# Patient Record
Sex: MALE | Race: BLACK OR AFRICAN AMERICAN | NOT HISPANIC OR LATINO | URBAN - METROPOLITAN AREA
[De-identification: names, ages, dates, MRNs, and addresses within clinical notes are randomized per-mention and may not be internally consistent; named-entity substitution may affect disease eponyms.]

---

## 2024-08-05 PROCEDURE — 90792 PSYCH DIAG EVAL W/MED SRVCS: CPT | Mod: 95

## 2024-08-13 ENCOUNTER — INPATIENT (INPATIENT)
Facility: HOSPITAL | Age: 49
LOS: 28 days | Discharge: HOME | End: 2024-09-11
Attending: PSYCHIATRY & NEUROLOGY | Admitting: PSYCHIATRY & NEUROLOGY
Payer: MEDICAID

## 2024-08-13 VITALS
TEMPERATURE: 98 F | RESPIRATION RATE: 16 BRPM | OXYGEN SATURATION: 97 % | DIASTOLIC BLOOD PRESSURE: 83 MMHG | HEART RATE: 105 BPM | SYSTOLIC BLOOD PRESSURE: 122 MMHG

## 2024-08-13 PROCEDURE — 99285 EMERGENCY DEPT VISIT HI MDM: CPT

## 2024-08-13 NOTE — ED BEHAVIORAL HEALTH ASSESSMENT NOTE - NSBHATTESTATTENDBILLTIME_PSY_A_CORE
I attest my time as attending is greater than CHUCKIE time spent on qualifying patient care activities.
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
## 2024-08-13 NOTE — ED PROVIDER NOTE - CLINICAL SUMMARY MEDICAL DECISION MAKING FREE TEXT BOX
This is a 49 male presents old male past medical history depression,  Complaining of anxiety depression and suicidal ideation. Endorsed recently was discharged from [facility] for suicidality and depression. He opted out to take medication and  not feeling better. Reports life stressors as losing his apartment due to financial issues,  loosing his unemployment. Reports  intrusive thoughts and intermittent ah. Endorses pervious si attempt via od on pills many years ago. Reports drunk 1 beer today pta.
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
## 2024-08-13 NOTE — ED BEHAVIORAL HEALTH ASSESSMENT NOTE - OTHER PAST PSYCHIATRIC HISTORY (INCLUDE DETAILS REGARDING ONSET, COURSE OF ILLNESS, INPATIENT/OUTPATIENT TREATMENT)
[facility] - 8/6-8/13/24  [facility] [address]- s/p suicide attempt by overdose.

## 2024-08-13 NOTE — ED BEHAVIORAL HEALTH ASSESSMENT NOTE - NSBHATTESTTYPEVISIT_PSY_A_CORE
Attending evaluating patient with CHUCKIE (24603/59106 code) On-site Attending supervising CHUCKIE (99XXX codes)

## 2024-08-13 NOTE — ED ADULT NURSE NOTE - OBJECTIVE STATEMENT
Patient brought in by EMS for psych eval, family called 911 because pt has been making depressed and making suicidal statements denies plan or intent d/t housing and financial issues. Pt is awake, a&ox4, Calm and cooperative with sad affect. Pt reports consuming 1 beer today; denies illicit drug use. Reports recently discharged from [facility] for similar presentation. Pt denies AVH, paranoia or delusional thinking.
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
## 2024-08-13 NOTE — ED PROVIDER NOTE - OBJECTIVE STATEMENT
This is a 49 male presents old male past medical history depression,  Complaining of anxiety depression and suicidal ideation. Endorsed recently was discharged from [facility] for suicidality and depression. He opted out to take medication and  not feeling better. Reports life stressors as losing his apartment due to financial issues,  loosing his unemployment. Reports  intrusive thoughts and intermittent ah. Endorses pervious si attempt via od on pills many years ago. Reports drunk 1 beer today pta.

## 2024-08-13 NOTE — ED ADULT NURSE NOTE - TEMPLATE LIST FOR HEAD TO TOE ASSESSMENT
Psych/Behavioral
Follow up with your pediatrician within 48 hours of discharge.     Please follow-up with pediatric hematology for maternal history of factor V leiden trait

## 2024-08-13 NOTE — ED BEHAVIORAL HEALTH ASSESSMENT NOTE - SUBSTANCE ISSUES AND PLAN (INCLUDE STANDING AND PRN MEDICATION)
ETOH abuse- no concerns for withdrawal at this time - discharge today from [facility]
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
## 2024-08-13 NOTE — ED BEHAVIORAL HEALTH ASSESSMENT NOTE - ADDITIONAL DETAILS ALL
suicide attempt over 10 years by overdose. suicide attempt over 10 years by overdose.- per report required intubation
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
## 2024-08-13 NOTE — ED ADULT TRIAGE NOTE - CHIEF COMPLAINT QUOTE
Patient brought in by EMS for psych eval. Per EMS, family called 911 because pt. has been making depressed and making suicidal statements. Calm and cooperative in triage. No PMH.

## 2024-08-13 NOTE — ED BEHAVIORAL HEALTH ASSESSMENT NOTE - DESCRIPTION
cam and cooperative reported right knee arthritic pain. Lives alone- facing imminent eviction from home. Reports little support. calm and cooperative

## 2024-08-14 DIAGNOSIS — F33.2 MAJOR DEPRESSIVE DISORDER, RECURRENT SEVERE WITHOUT PSYCHOTIC FEATURES: ICD-10-CM

## 2024-08-14 DIAGNOSIS — F10.10 ALCOHOL ABUSE, UNCOMPLICATED: ICD-10-CM

## 2024-08-14 PROBLEM — Z91.51 PERSONAL HISTORY OF SUICIDAL BEHAVIOR: Chronic | Status: ACTIVE | Noted: 2024-08-04

## 2024-08-14 LAB
ALBUMIN SERPL ELPH-MCNC: 4.7 G/DL — SIGNIFICANT CHANGE UP (ref 3.3–5)
ALP SERPL-CCNC: 87 U/L — SIGNIFICANT CHANGE UP (ref 40–120)
ALT FLD-CCNC: 20 U/L — SIGNIFICANT CHANGE UP (ref 4–41)
AMPHET UR-MCNC: NEGATIVE — SIGNIFICANT CHANGE UP
ANION GAP SERPL CALC-SCNC: 14 MMOL/L — SIGNIFICANT CHANGE UP (ref 7–14)
APAP SERPL-MCNC: <10 UG/ML — LOW (ref 15–25)
APPEARANCE UR: CLEAR — SIGNIFICANT CHANGE UP
AST SERPL-CCNC: 26 U/L — SIGNIFICANT CHANGE UP (ref 4–40)
BACTERIA # UR AUTO: NEGATIVE /HPF — SIGNIFICANT CHANGE UP
BARBITURATES UR SCN-MCNC: NEGATIVE — SIGNIFICANT CHANGE UP
BASOPHILS # BLD AUTO: 0.06 K/UL — SIGNIFICANT CHANGE UP (ref 0–0.2)
BASOPHILS NFR BLD AUTO: 0.9 % — SIGNIFICANT CHANGE UP (ref 0–2)
BENZODIAZ UR-MCNC: NEGATIVE — SIGNIFICANT CHANGE UP
BILIRUB SERPL-MCNC: <0.2 MG/DL — SIGNIFICANT CHANGE UP (ref 0.2–1.2)
BILIRUB UR-MCNC: NEGATIVE — SIGNIFICANT CHANGE UP
BUN SERPL-MCNC: 16 MG/DL — SIGNIFICANT CHANGE UP (ref 7–23)
CALCIUM SERPL-MCNC: 9.8 MG/DL — SIGNIFICANT CHANGE UP (ref 8.4–10.5)
CAST: 0 /LPF — SIGNIFICANT CHANGE UP (ref 0–4)
CHLORIDE SERPL-SCNC: 101 MMOL/L — SIGNIFICANT CHANGE UP (ref 98–107)
CO2 SERPL-SCNC: 21 MMOL/L — LOW (ref 22–31)
COCAINE METAB.OTHER UR-MCNC: NEGATIVE — SIGNIFICANT CHANGE UP
COLOR SPEC: YELLOW — SIGNIFICANT CHANGE UP
CREAT SERPL-MCNC: 1.04 MG/DL — SIGNIFICANT CHANGE UP (ref 0.5–1.3)
CREATININE URINE RESULT, DAU: 192 MG/DL — SIGNIFICANT CHANGE UP
DIFF PNL FLD: ABNORMAL
EGFR: 89 ML/MIN/1.73M2 — SIGNIFICANT CHANGE UP
EOSINOPHIL # BLD AUTO: 0.15 K/UL — SIGNIFICANT CHANGE UP (ref 0–0.5)
EOSINOPHIL NFR BLD AUTO: 2.3 % — SIGNIFICANT CHANGE UP (ref 0–6)
ETHANOL SERPL-MCNC: 98 MG/DL — HIGH
FENTANYL UR QL SCN: NEGATIVE — SIGNIFICANT CHANGE UP
GLUCOSE SERPL-MCNC: 96 MG/DL — SIGNIFICANT CHANGE UP (ref 70–99)
GLUCOSE UR QL: NEGATIVE MG/DL — SIGNIFICANT CHANGE UP
HCT VFR BLD CALC: 41.4 % — SIGNIFICANT CHANGE UP (ref 39–50)
HGB BLD-MCNC: 13.9 G/DL — SIGNIFICANT CHANGE UP (ref 13–17)
IANC: 2.82 K/UL — SIGNIFICANT CHANGE UP (ref 1.8–7.4)
IMM GRANULOCYTES NFR BLD AUTO: 0.3 % — SIGNIFICANT CHANGE UP (ref 0–0.9)
KETONES UR-MCNC: NEGATIVE MG/DL — SIGNIFICANT CHANGE UP
LEUKOCYTE ESTERASE UR-ACNC: NEGATIVE — SIGNIFICANT CHANGE UP
LYMPHOCYTES # BLD AUTO: 2.58 K/UL — SIGNIFICANT CHANGE UP (ref 1–3.3)
LYMPHOCYTES # BLD AUTO: 39 % — SIGNIFICANT CHANGE UP (ref 13–44)
MCHC RBC-ENTMCNC: 31.9 PG — SIGNIFICANT CHANGE UP (ref 27–34)
MCHC RBC-ENTMCNC: 33.6 GM/DL — SIGNIFICANT CHANGE UP (ref 32–36)
MCV RBC AUTO: 95 FL — SIGNIFICANT CHANGE UP (ref 80–100)
METHADONE UR-MCNC: NEGATIVE — SIGNIFICANT CHANGE UP
MONOCYTES # BLD AUTO: 0.98 K/UL — HIGH (ref 0–0.9)
MONOCYTES NFR BLD AUTO: 14.8 % — HIGH (ref 2–14)
NEUTROPHILS # BLD AUTO: 2.82 K/UL — SIGNIFICANT CHANGE UP (ref 1.8–7.4)
NEUTROPHILS NFR BLD AUTO: 42.7 % — LOW (ref 43–77)
NITRITE UR-MCNC: NEGATIVE — SIGNIFICANT CHANGE UP
NRBC # BLD: 0 /100 WBCS — SIGNIFICANT CHANGE UP (ref 0–0)
NRBC # FLD: 0 K/UL — SIGNIFICANT CHANGE UP (ref 0–0)
OPIATES UR-MCNC: NEGATIVE — SIGNIFICANT CHANGE UP
OXYCODONE UR-MCNC: NEGATIVE — SIGNIFICANT CHANGE UP
PCP SPEC-MCNC: SIGNIFICANT CHANGE UP
PCP UR-MCNC: POSITIVE
PH UR: 5 — SIGNIFICANT CHANGE UP (ref 5–8)
PLATELET # BLD AUTO: 321 K/UL — SIGNIFICANT CHANGE UP (ref 150–400)
POTASSIUM SERPL-MCNC: 4.2 MMOL/L — SIGNIFICANT CHANGE UP (ref 3.5–5.3)
POTASSIUM SERPL-SCNC: 4.2 MMOL/L — SIGNIFICANT CHANGE UP (ref 3.5–5.3)
PROT SERPL-MCNC: 8.1 G/DL — SIGNIFICANT CHANGE UP (ref 6–8.3)
PROT UR-MCNC: SIGNIFICANT CHANGE UP MG/DL
RBC # BLD: 4.36 M/UL — SIGNIFICANT CHANGE UP (ref 4.2–5.8)
RBC # FLD: 13.9 % — SIGNIFICANT CHANGE UP (ref 10.3–14.5)
RBC CASTS # UR COMP ASSIST: 1 /HPF — SIGNIFICANT CHANGE UP (ref 0–4)
SALICYLATES SERPL-MCNC: <0.3 MG/DL — LOW (ref 15–30)
SARS-COV-2 RNA SPEC QL NAA+PROBE: SIGNIFICANT CHANGE UP
SODIUM SERPL-SCNC: 136 MMOL/L — SIGNIFICANT CHANGE UP (ref 135–145)
SP GR SPEC: 1.03 — HIGH (ref 1–1.03)
SQUAMOUS # UR AUTO: 0 /HPF — SIGNIFICANT CHANGE UP (ref 0–5)
THC UR QL: NEGATIVE — SIGNIFICANT CHANGE UP
TOXICOLOGY SCREEN, DRUGS OF ABUSE, SERUM RESULT: SIGNIFICANT CHANGE UP
TSH SERPL-MCNC: 1.63 UIU/ML — SIGNIFICANT CHANGE UP (ref 0.27–4.2)
UROBILINOGEN FLD QL: 0.2 MG/DL — SIGNIFICANT CHANGE UP (ref 0.2–1)
WBC # BLD: 6.61 K/UL — SIGNIFICANT CHANGE UP (ref 3.8–10.5)
WBC # FLD AUTO: 6.61 K/UL — SIGNIFICANT CHANGE UP (ref 3.8–10.5)
WBC UR QL: 1 /HPF — SIGNIFICANT CHANGE UP (ref 0–5)

## 2024-08-14 PROCEDURE — 99212 OFFICE O/P EST SF 10 MIN: CPT

## 2024-08-14 PROCEDURE — 99223 1ST HOSP IP/OBS HIGH 75: CPT | Mod: 25

## 2024-08-14 RX ORDER — ACETAMINOPHEN WITH CODEINE 300MG-30MG
500 TABLET ORAL EVERY 12 HOURS
Refills: 0 | Status: DISCONTINUED | OUTPATIENT
Start: 2024-08-14 | End: 2024-08-25

## 2024-08-14 RX ORDER — HYDROXYZINE HCL 25 MG
50 TABLET ORAL EVERY 6 HOURS
Refills: 0 | Status: DISCONTINUED | OUTPATIENT
Start: 2024-08-14 | End: 2024-09-11

## 2024-08-14 RX ORDER — LORAZEPAM 4 MG/ML
2 INJECTION INTRAMUSCULAR; INTRAVENOUS ONCE
Refills: 0 | Status: DISCONTINUED | OUTPATIENT
Start: 2024-08-14 | End: 2024-08-19

## 2024-08-14 NOTE — ED ADULT NURSE REASSESSMENT NOTE - NS ED NURSE REASSESS COMMENT FT1
Pt remains resting comfortably in bed with even and unlabored respirations observed. Pending psychiatric voluntary admission when bed is available. Ongoing monitoring for safety continues.

## 2024-08-14 NOTE — BH INPATIENT PSYCHIATRY ASSESSMENT NOTE - NSBHCHARTREVIEWVS_PSY_A_CORE FT
Vital Signs Last 24 Hrs  T(C): 36.8 (08-14-24 @ 12:48), Max: 36.9 (08-13-24 @ 22:18)  T(F): 98.2 (08-14-24 @ 12:48), Max: 98.4 (08-13-24 @ 22:18)  HR: 66 (08-14-24 @ 12:48) (66 - 105)  BP: 116/78 (08-14-24 @ 12:48) (116/78 - 124/78)  BP(mean): --  RR: 16 (08-14-24 @ 12:48) (16 - 18)  SpO2: 99% (08-14-24 @ 12:48) (97% - 99%)
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
## 2024-08-14 NOTE — BH INPATIENT PSYCHIATRY ASSESSMENT NOTE - CURRENT MEDICATION
MEDICATIONS  (STANDING):    MEDICATIONS  (PRN):  hydrOXYzine hydrochloride 50 milliGRAM(s) Oral every 6 hours PRN acute anxiety  LORazepam   Injectable 2 milliGRAM(s) IntraMuscular Once PRN severe agitation
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
## 2024-08-14 NOTE — ED ADULT NURSE REASSESSMENT NOTE - NS ED NURSE REASSESS COMMENT FT1
Pt is currently laying in bed with even unlabored respirations observed. Psych eval completed, lab results pending for voluntary psychiatric admission. Ongoing monitoring for safety continues.

## 2024-08-14 NOTE — ED BEHAVIORAL HEALTH PROGRESS NOTE - RISK ASSESSMENT
moderate risk- past suicide attempt, current passive suicidal ideation, facing eviction from home, loss of job.     protective- seeking treatment, reports reason for living, futuristic.

## 2024-08-14 NOTE — ED BEHAVIORAL HEALTH PROGRESS NOTE - SUBSTANCE ISSUES AND PLAN (INCLUDE STANDING AND PRN MEDICATION)
ETOH abuse- no concerns for withdrawal at this time - discharge today from [facility]
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
## 2024-08-14 NOTE — BH INPATIENT PSYCHIATRY ASSESSMENT NOTE - OTHER PAST PSYCHIATRIC HISTORY (INCLUDE DETAILS REGARDING ONSET, COURSE OF ILLNESS, INPATIENT/OUTPATIENT TREATMENT)
[facility] - 8/6-8/13/24  [facility] [address]- s/p suicide attempt by overdose.  another reported suicide attempt between job perhaps around 2021, undocumented, no follow up

## 2024-08-14 NOTE — BH PATIENT PROFILE - FALL HARM RISK - UNIVERSAL INTERVENTIONS
Bed in lowest position, wheels locked, appropriate side rails in place/Call bell, personal items and telephone in reach/Instruct patient to call for assistance before getting out of bed or chair/Non-slip footwear when patient is out of bed/[place] to call system/Physically safe environment - no spills, clutter or unnecessary equipment/Purposeful Proactive Rounding/Room/bathroom lighting operational, light cord in reach

## 2024-08-15 PROCEDURE — 99232 SBSQ HOSP IP/OBS MODERATE 35: CPT

## 2024-08-15 RX ORDER — TUBERCULIN,PPD,MULTI-PUNCTURE
5 SKIN TEST INTRADERMAL ONCE
Refills: 0 | Status: COMPLETED | OUTPATIENT
Start: 2024-08-15 | End: 2024-08-15

## 2024-08-15 RX ADMIN — Medication 5 UNIT(S): at 13:01

## 2024-08-15 NOTE — BH SOCIAL WORK INITIAL PSYCHOSOCIAL EVALUATION - OTHER PAST PSYCHIATRIC HISTORY (INCLUDE DETAILS REGARDING ONSET, COURSE OF ILLNESS, INPATIENT/OUTPATIENT TREATMENT)
Pt is a 48 y/o single male, non-caregiver, domiciled alone, with a history of depression and 1 documented suicide attempt 10+ years ago by OD, resulting in a medical admission. Pt came to the ED via EMS the same day of his discharge from [facility], after going to his aunt's house and asking her to call 911. Pt had returned to his apartment, where he has no electricity, and suicidal thoughts returned. Pt has a history of alcohol abuse and PCP use. He was sober x 10 years but relapsed 1.5 years ago. 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 Currently reporting feeling helpless and hopeless about his future.

## 2024-08-15 NOTE — BH SOCIAL WORK INITIAL PSYCHOSOCIAL EVALUATION - NSBHLEGALRESTRAINPT_PSY_ALL_CORE
No Assistance OOB with selected safe patient handling equipment if applicable/Assistance with ambulation/Communicate fall risk and risk factors to all staff, patient, and family/Provide visual cue: yellow wristband, yellow gown, etc/Reinforce activity limits and safety measures with patient and family/Call bell, personal items and telephone in reach/Instruct patient to call for assistance before getting out of bed/chair/stretcher/Non-slip footwear applied when patient is off stretcher/[place] to call system/Physically safe environment - no spills, clutter or unnecessary equipment/Purposeful Proactive Rounding/Room/bathroom lighting operational, light cord in reach

## 2024-08-15 NOTE — BH SOCIAL WORK INITIAL PSYCHOSOCIAL EVALUATION - NSBHSUBSTANCELAST_PSY_ALL_CORE
How Severe Is Your Skin Lesion?: moderate Has Your Skin Lesion Been Treated?: not been treated Is This A New Presentation, Or A Follow-Up?: Skin Lesion Within the past 30 days

## 2024-08-15 NOTE — BH TREATMENT PLAN - NSTXSUBMISGOALOTHER_PSY_ALL_CORE
Patient will benefit from identifying and conveying an understanding of 2-3 triggers to substance use.

## 2024-08-15 NOTE — BH TREATMENT PLAN - NSTXCOPEINTERRN_PSY_ALL_CORE
Assess current coping skills  Assess readiness to learn  Encourage daily participation in daily psychoeducational groups and activities   Monitor medication compliance and response
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
## 2024-08-15 NOTE — BH SOCIAL WORK INITIAL PSYCHOSOCIAL EVALUATION - NSBHSAALC_PSY_A_CORE FT
reports drinking a beer today  Was sober for 10 years, then began abusing alcohol again approx 1 year ago. Was drinking up to 4 pints of vodka/day.

## 2024-08-15 NOTE — PSYCHIATRIC REHAB INITIAL EVALUATION - NSBHPRRECOMMEND_PSY_ALL_CORE
Patient was receptive to interview with writer and allowed orientation to psychiatric rehabilitation role and function on the unit.  Patient was forthcoming with admitting circumstances and presented with a generally dysthymic/subdued mood.   Patient was dressed casually yet neatly.   Patient was receptive to establish a relevant psychiatric rehabilitation to identify triggers to substance use.    At the time of interview, patient denied SI/HI.

## 2024-08-15 NOTE — BH TREATMENT PLAN - NSCMSPTSTRENGTHS_PSY_ALL_CORE
75 year old female with medical history of HTN and DM presented to ED with worsening shortness of breath and chest discomfort,NSTEM- TYpe I MI, pulmonary edema .  1.Tele monitoring  2.NSTEM-heparin drip,asa,b blocker,statin.  3.HTN-coreg, cozaar 50mg bid.  4.Echocardiogram.  5.DM-Insulin.  6.Lipid d/o-statin.  7.Cardiac cath today.  8.Pulmonary edema-d/c IV lasix.  9.PPI.
Highly motivated for treatment

## 2024-08-15 NOTE — BH TREATMENT PLAN - NSTXSUBMISINTERPR_PSY_ALL_CORE
Patient will benefit from 1:1 engagement, psycho-education and support to facilitate progress towards specified goal.  Patient is also encouraged to attend groups for communal support and engagement.

## 2024-08-16 PROCEDURE — 99231 SBSQ HOSP IP/OBS SF/LOW 25: CPT

## 2024-08-16 RX ADMIN — Medication 500 MILLIGRAM(S): at 19:35

## 2024-08-16 RX ADMIN — Medication 500 MILLIGRAM(S): at 20:35

## 2024-08-16 NOTE — BH INPATIENT PSYCHIATRY PROGRESS NOTE - NSBHASSESSSUMMFT_PSY_ALL_CORE
Patient is a 49 year old, male; domicile alone; single; noncaregiver; unemployed; PPH of depression; two previous hospitalizations (discharged today from [facility]) ; one past suicide attempt by overdose over 10 years ago; remote history of violence or arrests; h/o ETOH and PCP abuse ( reports having one beer today); no known h/o complicated withdrawal; PMH of arthritis to right knee; brought in by EMS; called by aunt for suicidal ideation.   He was discharged from another inpatient service on the day of admission.  Utox +etoh (98) and PCP.  Differential includes mdd, severe and subtance induced mood disorder.      Emotionally regulated, in good behavioral control, continue plan below.     1. Continue 913 hospitalization for stabilization and safety  2. haldol, ativan and benadryl prns away from alcohol while on other inpatient services, one day of drinking, withdrawal seizures unlikely  3. routine checks--reprots safe on unit, will notify staff if suicidality retruns  4.  does not favor antidepressent.  discussed wellbutrin but seizure risk.  discuss remeron.  will re-approach in am  5.  arthritic knee pain--naproxen prn  6. smoking cessation--decliens patch will write for gum

## 2024-08-17 PROCEDURE — 99231 SBSQ HOSP IP/OBS SF/LOW 25: CPT

## 2024-08-17 RX ORDER — POLYETHYLENE GLYCOL 3350 17 G/17G
17 POWDER, FOR SOLUTION ORAL ONCE
Refills: 0 | Status: COMPLETED | OUTPATIENT
Start: 2024-08-17 | End: 2024-08-18

## 2024-08-17 RX ADMIN — Medication 5 UNIT(S): at 17:27

## 2024-08-17 RX ADMIN — Medication 500 MILLIGRAM(S): at 09:58

## 2024-08-17 RX ADMIN — Medication 500 MILLIGRAM(S): at 08:46

## 2024-08-17 NOTE — BH INPATIENT PSYCHIATRY PROGRESS NOTE - NSBHASSESSSUMMFT_PSY_ALL_CORE
Patient is a 49 year old, male; domicile alone; single; noncaregiver; unemployed; PPH of depression; two previous hospitalizations (discharged today from [facility]) ; one past suicide attempt by overdose over 10 years ago; remote history of violence or arrests; h/o ETOH and PCP abuse ( reports having one beer today); no known h/o complicated withdrawal; PMH of arthritis to right knee; brought in by EMS; called by aunt for suicidal ideation.   He was discharged from another inpatient service on the day of admission.  Utox +etoh (98) and PCP.  Differential includes mdd, severe and subtance induced mood disorder.      Emotionally regulated, in good behavioral control, continue plan below. Refusing to stat on any meds. Denies current SIIP    1. Continue 913 hospitalization for stabilization and safety  2. haldol, ativan and benadryl prns away from alcohol while on other inpatient services, one day of drinking, withdrawal seizures unlikely  3. routine checks--reprots safe on unit, will notify staff if suicidality retruns  4.  does not favor antidepressent.  discussed wellbutrin but seizure risk.  discuss remeron.  will re-approach in am  5.  arthritic knee pain--naproxen prn  6. smoking cessation--decliens patch will write for gum

## 2024-08-18 PROCEDURE — 99231 SBSQ HOSP IP/OBS SF/LOW 25: CPT

## 2024-08-18 RX ORDER — TRAZODONE HCL 50 MG
50 TABLET ORAL AT BEDTIME
Refills: 0 | Status: DISCONTINUED | OUTPATIENT
Start: 2024-08-18 | End: 2024-09-11

## 2024-08-18 RX ADMIN — POLYETHYLENE GLYCOL 3350 17 GRAM(S): 17 POWDER, FOR SOLUTION ORAL at 08:52

## 2024-08-18 RX ADMIN — Medication 500 MILLIGRAM(S): at 08:48

## 2024-08-18 RX ADMIN — Medication 500 MILLIGRAM(S): at 22:01

## 2024-08-18 RX ADMIN — Medication 50 MILLIGRAM(S): at 21:27

## 2024-08-18 RX ADMIN — Medication 500 MILLIGRAM(S): at 21:28

## 2024-08-18 NOTE — BH INPATIENT PSYCHIATRY PROGRESS NOTE - NSBHASSESSSUMMFT_PSY_ALL_CORE
Patient is a 49 year old, male; domicile alone; single; noncaregiver; unemployed; PPH of depression; two previous hospitalizations (discharged today from [facility]) ; one past suicide attempt by overdose over 10 years ago; remote history of violence or arrests; h/o ETOH and PCP abuse ( reports having one beer today); no known h/o complicated withdrawal; PMH of arthritis to right knee; brought in by EMS; called by aunt for suicidal ideation.   He was discharged from another inpatient service on the day of admission.  Utox +etoh (98) and PCP.  Differential includes mdd, severe and subtance induced mood disorder.      Pt. reports attenuated Sx, denies AVH, delusions, SI/HI. He received Miralax for constipation. No additional concerns. Will continue current plan.     1. Continue 913 hospitalization for stabilization and safety  2. haldol, ativan and benadryl prns away from alcohol while on other inpatient services, one day of drinking, withdrawal seizures unlikely  3. routine checks--reprots safe on unit, will notify staff if suicidality retruns  4.  does not favor antidepressent.  discussed wellbutrin but seizure risk.  discuss remeron.  will re-approach in am  5.  arthritic knee pain--naproxen prn  6. smoking cessation--declines patch will write for gum

## 2024-08-19 PROCEDURE — 99232 SBSQ HOSP IP/OBS MODERATE 35: CPT

## 2024-08-19 RX ORDER — BUPROPION HYDROCHLORIDE 150 MG/1
150 TABLET ORAL DAILY
Refills: 0 | Status: DISCONTINUED | OUTPATIENT
Start: 2024-08-20 | End: 2024-08-29

## 2024-08-19 RX ORDER — TRAZODONE HCL 50 MG
100 TABLET ORAL AT BEDTIME
Refills: 0 | Status: DISCONTINUED | OUTPATIENT
Start: 2024-08-19 | End: 2024-09-11

## 2024-08-19 RX ORDER — LORAZEPAM 4 MG/ML
2 INJECTION INTRAMUSCULAR; INTRAVENOUS ONCE
Refills: 0 | Status: DISCONTINUED | OUTPATIENT
Start: 2024-08-19 | End: 2024-08-26

## 2024-08-19 RX ORDER — POLYETHYLENE GLYCOL 3350 17 G/17G
17 POWDER, FOR SOLUTION ORAL
Refills: 0 | Status: DISCONTINUED | OUTPATIENT
Start: 2024-08-19 | End: 2024-09-11

## 2024-08-19 RX ORDER — MAGNESIUM, ALUMINUM HYDROXIDE 200-225/5
30 SUSPENSION, ORAL (FINAL DOSE FORM) ORAL EVERY 6 HOURS
Refills: 0 | Status: DISCONTINUED | OUTPATIENT
Start: 2024-08-19 | End: 2024-09-11

## 2024-08-19 RX ADMIN — POLYETHYLENE GLYCOL 3350 17 GRAM(S): 17 POWDER, FOR SOLUTION ORAL at 19:28

## 2024-08-19 RX ADMIN — Medication 100 MILLIGRAM(S): at 21:12

## 2024-08-19 RX ADMIN — Medication 500 MILLIGRAM(S): at 11:04

## 2024-08-19 NOTE — BH INPATIENT PSYCHIATRY PROGRESS NOTE - NSBHASSESSSUMMFT_PSY_ALL_CORE
Patient is a 49 year old, male; domicile alone; single; noncaregiver; unemployed; PPH of depression; two previous hospitalizations (discharged today from [facility]) ; one past suicide attempt by overdose over 10 years ago; remote history of violence or arrests; h/o ETOH and PCP abuse ( reports having one beer today); no known h/o complicated withdrawal; PMH of arthritis to right knee; brought in by EMS; called by aunt for suicidal ideation.   He was discharged from another inpatient service on the day of admission.  Utox +etoh (98) and PCP.  Differential includes mdd, severe and substance induced mood disorder.      Pt. reports depressive Sx, denies AVH, delusions, SI/HI. He reports c/o insomnia. No additional concerns. Will start Wellbutrin XL 150mg daily tomorrow, and provide increased Trazodone dose to 100mg at bedtime with 50mg prn available.       1. Continue 913 hospitalization for stabilization and safety  2. haldol, ativan and benadryl prns away from alcohol while on other inpatient services, one day of drinking, withdrawal seizures unlikely  3. routine checks--reports feeling safe on unit, will notify staff if suicidality returns, no CO indicated.  4. start Wellbutrin XL 150mg daily for rx of MDD sx, Trazodone 100mg QHS for insomnia and 50mg prn  5.  arthritic knee pain--naproxen prn, miralax prn constipation, maalox prn  6. smoking cessation--declines patch will write for gum  7. group/ milieu therapy  8. Dispo: SW to pursue discharge planning

## 2024-08-20 PROCEDURE — 99232 SBSQ HOSP IP/OBS MODERATE 35: CPT

## 2024-08-20 PROCEDURE — 90853 GROUP PSYCHOTHERAPY: CPT

## 2024-08-20 RX ORDER — IBUPROFEN 600 MG
400 TABLET ORAL ONCE
Refills: 0 | Status: COMPLETED | OUTPATIENT
Start: 2024-08-20 | End: 2024-08-20

## 2024-08-20 RX ADMIN — BUPROPION HYDROCHLORIDE 150 MILLIGRAM(S): 150 TABLET ORAL at 08:08

## 2024-08-20 RX ADMIN — Medication 100 MILLIGRAM(S): at 20:32

## 2024-08-20 RX ADMIN — Medication 400 MILLIGRAM(S): at 21:51

## 2024-08-20 NOTE — BH INPATIENT PSYCHIATRY PROGRESS NOTE - NSBHASSESSSUMMFT_PSY_ALL_CORE
Patient is a 49 year old, male; domicile alone; single; noncaregiver; unemployed; PPH of depression; two previous hospitalizations (discharged today from [facility]) ; one past suicide attempt by overdose over 10 years ago; remote history of violence or arrests; h/o ETOH and PCP abuse ( reports having one beer today); no known h/o complicated withdrawal; PMH of arthritis to right knee; brought in by EMS; called by aunt for suicidal ideation.   He was discharged from another inpatient service on the day of admission.  Utox +etoh (98) and PCP.  Differential includes mdd, severe and substance induced mood disorder.      Pt. reports depressive Sx, denies AVH, delusions, SI/HI. He reports c/o insomnia. No additional concerns. Will start Wellbutrin XL 150mg daily tomorrow, and provide increased Trazodone dose to 100mg at bedtime with 50mg prn available.       1. Continue 913 hospitalization for stabilization and safety  2. haldol, ativan and benadryl prns away from alcohol while on other inpatient services, one day of drinking, withdrawal seizures unlikely  3. routine checks--reports feeling safe on unit, will notify staff if suicidality returns, no CO indicated.  4. started Wellbutrin XL 150mg daily for rx of MDD sx, Trazodone 100mg QHS for insomnia and 50mg prn  5.  arthritic knee pain--naproxen prn, miralax prn constipation, maalox prn  6. smoking cessation--declines patch will write for gum  7. group/ milieu therapy  8. Dispo: SW to pursue discharge planning

## 2024-08-21 PROCEDURE — 99232 SBSQ HOSP IP/OBS MODERATE 35: CPT

## 2024-08-21 PROCEDURE — 90853 GROUP PSYCHOTHERAPY: CPT

## 2024-08-21 RX ADMIN — BUPROPION HYDROCHLORIDE 150 MILLIGRAM(S): 150 TABLET ORAL at 08:35

## 2024-08-21 RX ADMIN — Medication 100 MILLIGRAM(S): at 20:12

## 2024-08-21 NOTE — BH INPATIENT PSYCHIATRY PROGRESS NOTE - NSBHASSESSSUMMFT_PSY_ALL_CORE
Patient is a 49 year old, male; domicile alone; single; noncaregiver; unemployed; PPH of depression; two previous hospitalizations (discharged today from [facility]) ; one past suicide attempt by overdose over 10 years ago; remote history of violence or arrests; h/o ETOH and PCP abuse ( reports having one beer today); no known h/o complicated withdrawal; PMH of arthritis to right knee; brought in by EMS; called by aunt for suicidal ideation.   He was discharged from another inpatient service on the day of admission.  Utox +etoh (98) and PCP.  Differential includes mdd, severe and substance induced mood disorder.      Pt. reports depressive Sx, denies AVH, delusions, SI/HI. He reports c/o insomnia. No additional concerns. Will c/w Wellbutrin XL 150mg daily and c/w increased Trazodone dose 100mg at bedtime with 50mg prn available.       1. Continue 913 hospitalization for stabilization and safety  2. haldol, ativan and benadryl prns away from alcohol while on other inpatient services, one day of drinking, withdrawal seizures unlikely  3. routine checks--reports feeling safe on unit, will notify staff if suicidality returns, no CO indicated.  4. Continue with Wellbutrin XL 150mg daily for rx of MDD sx started on 8/20, Trazodone 100mg QHS for insomnia and 50mg prn  5.  arthritic knee pain--naproxen prn, miralax prn constipation, maalox prn  6. smoking cessation--declines patch will write for gum  7. group/ milieu therapy  8. Dispo: SW to pursue discharge planning

## 2024-08-22 PROCEDURE — 99232 SBSQ HOSP IP/OBS MODERATE 35: CPT

## 2024-08-22 PROCEDURE — 90853 GROUP PSYCHOTHERAPY: CPT

## 2024-08-22 RX ADMIN — BUPROPION HYDROCHLORIDE 150 MILLIGRAM(S): 150 TABLET ORAL at 08:14

## 2024-08-22 RX ADMIN — Medication 100 MILLIGRAM(S): at 20:53

## 2024-08-22 NOTE — BH PSYCHOLOGY - GROUP THERAPY NOTE - NSPSYCHOLGRPBILLING_PSY_A_CORE
[id number] - Group Psychotherapy
[id number] - Group Psychotherapy
[id number] - Group Psychotherapy
[id number] - Group Psychotherapy

## 2024-08-22 NOTE — BH INPATIENT PSYCHIATRY PROGRESS NOTE - NSBHASSESSSUMMFT_PSY_ALL_CORE
Patient is a 49 year old, male; domicile alone; single; noncaregiver; unemployed; PPH of depression; two previous hospitalizations (discharged today from [facility]) ; one past suicide attempt by overdose over 10 years ago; remote history of violence or arrests; h/o ETOH and PCP abuse ( reports having one beer today); no known h/o complicated withdrawal; PMH of arthritis to right knee; brought in by EMS; called by aunt for suicidal ideation.   He was discharged from another inpatient service on the day of admission.  Utox +etoh (98) and PCP.  Differential includes mdd, severe and substance induced mood disorder.      Pt. reports depressive Sx, denies AVH, delusions, SI/HI. He reports c/o insomnia. No additional concerns. Will start Wellbutrin XL 150mg daily tomorrow, and provide increased Trazodone dose to 100mg at bedtime with 50mg prn available.       1. Continue 913 hospitalization for stabilization and safety  2. haldol, ativan and benadryl prns away from alcohol while on other inpatient services, one day of drinking, withdrawal seizures unlikely  3. routine checks--reports feeling safe on unit, will notify staff if suicidality returns, no CO indicated.  4. started Wellbutrin XL 150mg daily for rx of MDD sx, Trazodone 100mg QHS for insomnia and 50mg prn  5.  arthritic knee pain--naproxen prn, miralax prn constipation, maalox prn  6. smoking cessation--declines patch will write for gum  7. group/ milieu therapy  8. Dispo: SW to pursue discharge planning

## 2024-08-22 NOTE — BH PSYCHOLOGY - GROUP THERAPY NOTE - NSPSYCHOLGRPGENPT_PSY_A_CORE FT
Patient attended the psychology cognitive-behavior therapy group. The discussion was focused on the topic of Tolerating Uncertainty. Group expectations, guidelines, confidentiality and its limitations were reviewed. The group began with defining intolerance of uncertainty. The concept of challenging the need for certainty and learning how to deliberately do uncertain things in order to acclimate to uncertainty was explained. The acronym APPLE was used as a strategy to tolerate uncertainty: Acknowledge, Pause, Pull Back, Let Go, and Explore. Group members demonstrated their understanding through active participation, discussion, and by asking clarifying questions. Discussion stemmed from the group's focus on the connection between thoughts, feelings and behaviors. Group members were provided with a handout describing the concepts and strategies discussed.
Patient attended the psychology cognitive-behavior therapy group. The discussion was focused on the topic of Tolerating Uncertainty. Group expectations, guidelines, confidentiality and its limitations were reviewed. The group began with defining intolerance of uncertainty. The concept of challenging the need for certainty and learning how to deliberately do uncertain things in order to acclimate to uncertainty was explained. The acronym APPLE was used as a strategy to tolerate uncertainty: Acknowledge, Pause, Pull Back, Let Go, and Explore. Group members demonstrated their understanding through active participation, discussion, and by asking clarifying questions. Discussion stemmed from the group's focus on the connection between thoughts, feelings and behaviors. Group members were provided with a handout describing the concepts and strategies discussed.
Patient attended the psychology cognitive-behavior therapy group. The discussion was focused on the topic of Convert Modeling. Group expectations, guidelines, confidentiality and its limitations were reviewed. The group began with defining the term convert modeling then outlining several examples. Group members then learned about how to visualize and practice convert modeling techniques as well as its implementation.  Group members demonstrated their understanding through active participation, discussion, and by asking clarifying questions. Discussion stemmed from the group's focus on the connection between thoughts, feelings and behaviors. Group members were provided with a handout describing the concepts and strategies discussed.
Patient attended the cognitive behavior therapy group session. Group focused on topics including understanding the importance of sleep, factors that can play a role in sleep difficulties, and tips for improving sleep. Group expectations and guidelines, as well as confidentiality and its limitations, were addressed. Principles of cognitive behavior therapy related to today's group topic were reviewed and discussed.  Group members illustrated understanding of these concepts by giving personal examples based on their current experiences. Discussions stemmed from the group topics to the causal connection between thoughts, feelings, and behaviors.

## 2024-08-22 NOTE — BH PSYCHOLOGY - GROUP THERAPY NOTE - TOKEN PULL-DIAGNOSIS
Primary Diagnosis:  Major depressive disorder [F32.9]        Problem Dx:   Alcohol abuse [F10.10]      Severe episode of recurrent major depressive disorder, without psychotic features [F33.2]      

## 2024-08-22 NOTE — BH PSYCHOLOGY - GROUP THERAPY NOTE - NSBHPSYCHOLPARTICIPCOMMENT_PSY_A_CORE FT
Patient arrived to the session on time; patient appeared attentive and interested in the group discussion.  Although the patient did not contribute spontaneously during the group session, patient was able to read from the worksheet when prompted. Patient was able to follow along when other group members spoke. Patient was able to engage with the  and interact with the other group members in an appropriate manner. 

## 2024-08-22 NOTE — BH PSYCHOLOGY - GROUP THERAPY NOTE - NSBHPSYCHOLRESPONSE_PSY_A_CORE
Symptoms reduced/Coping skills acquired/Accepted support

## 2024-08-23 PROCEDURE — 99232 SBSQ HOSP IP/OBS MODERATE 35: CPT

## 2024-08-23 RX ORDER — ACETAMINOPHEN WITH CODEINE 300MG-30MG
500 TABLET ORAL ONCE
Refills: 0 | Status: COMPLETED | OUTPATIENT
Start: 2024-08-23 | End: 2024-08-24

## 2024-08-23 RX ADMIN — BUPROPION HYDROCHLORIDE 150 MILLIGRAM(S): 150 TABLET ORAL at 08:05

## 2024-08-23 RX ADMIN — Medication 100 MILLIGRAM(S): at 20:27

## 2024-08-23 NOTE — BH INPATIENT PSYCHIATRY PROGRESS NOTE - NSBHASSESSSUMMFT_PSY_ALL_CORE
Patient is a 49 year old, male; domicile alone; single; noncaregiver; unemployed; PPH of depression; two previous hospitalizations (discharged today from [facility]) ; one past suicide attempt by overdose over 10 years ago; remote history of violence or arrests; h/o ETOH and PCP abuse ( reports having one beer today); no known h/o complicated withdrawal; PMH of arthritis to right knee; brought in by EMS; called by aunt for suicidal ideation.   He was discharged from another inpatient service on the day of admission.  Utox +etoh (98) and PCP.  Differential includes mdd, severe and substance induced mood disorder.      Pt. reports depressive Sx, denies AVH, delusions, SI/HI. He reports c/o insomnia. No additional concerns. Will start Wellbutrin XL 150mg daily tomorrow, and provide increased Trazodone dose to 100mg at bedtime with 50mg prn available.       1. Continue 913 hospitalization for stabilization and safety  2. haldol, ativan and benadryl prns away from alcohol while on other inpatient services, one day of drinking, withdrawal seizures unlikely  3. routine checks--reports feeling safe on unit, will notify staff if suicidality returns, no CO indicated.  4. started Wellbutrin XL 150mg daily for rx of MDD sx, Trazodone 100mg QHS for insomnia and 50mg prn  5.  arthritic knee pain--naproxen prn, miralax prn constipation, maalox prn  6. smoking cessation--declines patch will write for gum  7. group/ milieu therapy  8. Dispo: SW to pursue discharge planning

## 2024-08-24 RX ADMIN — BUPROPION HYDROCHLORIDE 150 MILLIGRAM(S): 150 TABLET ORAL at 08:12

## 2024-08-24 RX ADMIN — Medication 500 MILLIGRAM(S): at 08:54

## 2024-08-24 RX ADMIN — Medication 100 MILLIGRAM(S): at 20:14

## 2024-08-24 RX ADMIN — Medication 500 MILLIGRAM(S): at 08:14

## 2024-08-25 RX ORDER — LIDOCAINE/BENZALKONIUM/ALCOHOL
1 SOLUTION, NON-ORAL TOPICAL DAILY
Refills: 0 | Status: DISCONTINUED | OUTPATIENT
Start: 2024-08-25 | End: 2024-09-11

## 2024-08-25 RX ORDER — IBUPROFEN 600 MG
400 TABLET ORAL EVERY 8 HOURS
Refills: 0 | Status: DISCONTINUED | OUTPATIENT
Start: 2024-08-25 | End: 2024-09-05

## 2024-08-25 RX ADMIN — Medication 1 PATCH: at 09:51

## 2024-08-25 RX ADMIN — Medication 100 MILLIGRAM(S): at 20:08

## 2024-08-25 RX ADMIN — BUPROPION HYDROCHLORIDE 150 MILLIGRAM(S): 150 TABLET ORAL at 08:12

## 2024-08-25 RX ADMIN — Medication 1 PATCH: at 19:36

## 2024-08-25 RX ADMIN — Medication 1 PATCH: at 21:36

## 2024-08-26 PROCEDURE — 99232 SBSQ HOSP IP/OBS MODERATE 35: CPT

## 2024-08-26 RX ADMIN — Medication 100 MILLIGRAM(S): at 20:29

## 2024-08-26 RX ADMIN — BUPROPION HYDROCHLORIDE 150 MILLIGRAM(S): 150 TABLET ORAL at 08:11

## 2024-08-26 RX ADMIN — Medication 1 PATCH: at 09:52

## 2024-08-26 NOTE — BH INPATIENT PSYCHIATRY PROGRESS NOTE - NSBHASSESSSUMMFT_PSY_ALL_CORE
Patient is a 49 year old, male; domicile alone; single; noncaregiver; unemployed; PPH of depression; two previous hospitalizations (discharged today from [facility]) ; one past suicide attempt by overdose over 10 years ago; remote history of violence or arrests; h/o ETOH and PCP abuse ( reports having one beer today); no known h/o complicated withdrawal; PMH of arthritis to right knee; brought in by EMS; called by aunt for suicidal ideation.   He was discharged from another inpatient service on the day of admission.  Utox +etoh (98) and PCP.  Differential includes mdd, severe and substance induced mood disorder.      Pt. reports mood improving, denies AVH, delusions, SI/HI. Sleep improving, started Wellbutrin XL 150mg daily and provide increased Trazodone dose to 100mg at bedtime with 50mg prn available.       1. Continue 913 hospitalization for stabilization and safety  2. haldol, ativan and benadryl prns away from alcohol while on other inpatient services, one day of drinking, withdrawal seizures unlikely  3. routine checks--reports feeling safe on unit, will notify staff if suicidality returns, no CO indicated.  4. started Wellbutrin XL 150mg daily for rx of MDD sx, Trazodone 100mg QHS for insomnia and 50mg prn  5.  arthritic knee pain--naproxen prn, miralax prn constipation, maalox prn  6. smoking cessation--declines patch will write for gum  7. group/ milieu therapy  8. Dispo: SW to pursue discharge planning

## 2024-08-27 PROCEDURE — 99232 SBSQ HOSP IP/OBS MODERATE 35: CPT

## 2024-08-27 RX ADMIN — Medication 100 MILLIGRAM(S): at 20:24

## 2024-08-27 RX ADMIN — Medication 1 PATCH: at 08:04

## 2024-08-27 RX ADMIN — BUPROPION HYDROCHLORIDE 150 MILLIGRAM(S): 150 TABLET ORAL at 08:01

## 2024-08-27 NOTE — BH INPATIENT PSYCHIATRY PROGRESS NOTE - NSBHASSESSSUMMFT_PSY_ALL_CORE
Patient is a 49 year old, male; domicile alone; single; noncaregiver; unemployed; PPH of depression; two previous hospitalizations (discharged today from [facility]) ; one past suicide attempt by overdose over 10 years ago; remote history of violence or arrests; h/o ETOH and PCP abuse ( reports having one beer today); no known h/o complicated withdrawal; PMH of arthritis to right knee; brought in by EMS; called by aunt for suicidal ideation.   He was discharged from another inpatient service on the day of admission.  Utox +etoh (98) and PCP.  Differential includes mdd, severe and substance induced mood disorder.      Pt. reports mood improving, denies AVH, delusions, SI/HI. Sleep improving, started Wellbutrin XL 150mg daily and provide increased Trazodone dose to 100mg at bedtime with 50mg prn available.       1. Continue 913 hospitalization for stabilization and safety  2. haldol, ativan and benadryl prns away from alcohol while on other inpatient services, one day of drinking, withdrawal seizures unlikely  3. routine checks--reports feeling safe on unit, will notify staff if suicidality returns, no CO indicated.  4. started Wellbutrin XL 150mg daily for rx of MDD sx, Trazodone 100mg QHS for insomnia and 50mg prn  5.  arthritic knee pain--naproxen prn, miralax prn constipation, maalox prn  6. smoking cessation--declines patch will write for gum  7. group/ milieu therapy  8. Dispo: SW to pursue discharge planning

## 2024-08-28 PROCEDURE — 99232 SBSQ HOSP IP/OBS MODERATE 35: CPT

## 2024-08-28 RX ADMIN — BUPROPION HYDROCHLORIDE 150 MILLIGRAM(S): 150 TABLET ORAL at 08:53

## 2024-08-28 RX ADMIN — Medication 100 MILLIGRAM(S): at 20:30

## 2024-08-28 RX ADMIN — Medication 1 PATCH: at 10:10

## 2024-08-28 NOTE — BH INPATIENT PSYCHIATRY PROGRESS NOTE - NSBHASSESSSUMMFT_PSY_ALL_CORE
Patient is a 49 year old, male; domicile alone; single; noncaregiver; unemployed; PPH of depression; two previous hospitalizations (discharged today from [facility]) ; one past suicide attempt by overdose over 10 years ago; remote history of violence or arrests; h/o ETOH and PCP abuse ( reports having one beer today); no known h/o complicated withdrawal; PMH of arthritis to right knee; brought in by EMS; called by aunt for suicidal ideation.   He was discharged from another inpatient service on the day of admission.  Utox +etoh (98) and PCP.  Differential includes mdd, severe and substance induced mood disorder.      Pt. reports mood improving, denies AVH, delusions, SI/HI. Sleep improving, started Wellbutrin XL 150mg daily and provide increased Trazodone dose to 100mg at bedtime with 50mg prn available.       1. Continue 913 hospitalization for stabilization and safety  2. haldol, ativan and benadryl prns away from alcohol while on other inpatient services, one day of drinking, withdrawal seizures unlikely  3. routine checks--reports feeling safe on unit, will notify staff if suicidality returns, no CO indicated.  4. started Wellbutrin XL 150mg daily for rx of MDD sx, Trazodone 100mg QHS for insomnia and 50mg prn  5.  arthritic knee pain--naproxen prn, miralax prn constipation, maalox prn  6. smoking cessation--declines patch will write for gum  7. group/ milieu therapy  8. Dispo: SW to pursue discharge planning

## 2024-08-29 PROCEDURE — 99232 SBSQ HOSP IP/OBS MODERATE 35: CPT

## 2024-08-29 RX ADMIN — Medication 1 PATCH: at 11:07

## 2024-08-29 RX ADMIN — Medication 100 MILLIGRAM(S): at 20:26

## 2024-08-29 RX ADMIN — BUPROPION HYDROCHLORIDE 150 MILLIGRAM(S): 150 TABLET ORAL at 08:13

## 2024-08-29 NOTE — BH INPATIENT PSYCHIATRY PROGRESS NOTE - NSBHASSESSSUMMFT_PSY_ALL_CORE
Patient is a 49 year old, male; domicile alone; single; noncaregiver; unemployed; PPH of depression; two previous hospitalizations (discharged today from [facility]) ; one past suicide attempt by overdose over 10 years ago; remote history of violence or arrests; h/o ETOH and PCP abuse ( reports having one beer today); no known h/o complicated withdrawal; PMH of arthritis to right knee; brought in by EMS; called by aunt for suicidal ideation.   He was discharged from another inpatient service on the day of admission.  Utox +etoh (98) and PCP.  Differential includes mdd, severe and substance induced mood disorder.      Pt. reports mood improving, denies AVH, delusions, SI/HI. Sleep improved. D/max Wellbutrin d/t reported irritability     1. Continue 913 hospitalization for stabilization and safety  2. haldol, ativan and benadryl prns away from alcohol while on other inpatient services, one day of drinking, withdrawal seizures unlikely  3. routine checks--reports feeling safe on unit, will notify staff if suicidality returns, no CO indicated.  4. d/c wellbutrin  mg d/t reports increased irritability, Trazodone 100mg QHS for insomnia and 50mg prn  5.  arthritic knee pain--naproxen prn, miralax prn constipation, maalox prn  6. smoking cessation--declines patch will write for gum  7. group/ milieu therapy  8. Dispo: SW to pursue discharge planning

## 2024-08-30 PROCEDURE — 99232 SBSQ HOSP IP/OBS MODERATE 35: CPT

## 2024-08-30 RX ADMIN — Medication 1 PATCH: at 09:35

## 2024-08-30 RX ADMIN — Medication 100 MILLIGRAM(S): at 20:21

## 2024-08-30 NOTE — BH INPATIENT PSYCHIATRY PROGRESS NOTE - NSCGIIMPROVESX_PSY_ALL_CORE
4 = No change - symptoms remain essentially unchanged
(V5) oriented
4 = No change - symptoms remain essentially unchanged

## 2024-08-30 NOTE — BH INPATIENT PSYCHIATRY PROGRESS NOTE - NSBHASSESSSUMMFT_PSY_ALL_CORE
Patient is a 49 year old, male; domicile alone; single; noncaregiver; unemployed; PPH of depression; two previous hospitalizations (discharged today from [facility]) ; one past suicide attempt by overdose over 10 years ago; remote history of violence or arrests; h/o ETOH and PCP abuse ( reports having one beer today); no known h/o complicated withdrawal; PMH of arthritis to right knee; brought in by EMS; called by aunt for suicidal ideation.   He was discharged from another inpatient service on the day of admission.  Utox +etoh (98) and PCP.  Differential includes mdd, severe and substance induced mood disorder.      Pt. reports mood improving, denies AVH, delusions, SI/HI. Sleep improved. D/max Wellbutrin d/t reported irritability.     1. Continue 913 hospitalization for stabilization and safety  2. haldol, ativan and benadryl prns away from alcohol while on other inpatient services, one day of drinking, withdrawal seizures unlikely  3. routine checks--reports feeling safe on unit, will notify staff if suicidality returns, no CO indicated.  4. d/c wellbutrin  mg d/t reports increased irritability, Trazodone 100mg QHS for insomnia and 50mg prn  5.  arthritic knee pain--naproxen prn, miralax prn constipation, maalox prn  6. smoking cessation--declines patch will write for gum  7. group/ milieu therapy  8. Dispo: SW to pursue discharge planning

## 2024-08-30 NOTE — BH INPATIENT PSYCHIATRY PROGRESS NOTE - NSCGISEVERILLNESS_PSY_ALL_CORE
5 = Markedly ill - intrusive symptoms that distinctly impair social/occupational function or cause intrusive levels of distress
Home
5 = Markedly ill - intrusive symptoms that distinctly impair social/occupational function or cause intrusive levels of distress

## 2024-08-31 RX ADMIN — Medication 1 PATCH: at 09:22

## 2024-08-31 RX ADMIN — Medication 1 PATCH: at 20:59

## 2024-08-31 RX ADMIN — Medication 100 MILLIGRAM(S): at 20:34

## 2024-08-31 RX ADMIN — Medication 1 PATCH: at 19:15

## 2024-09-01 RX ADMIN — Medication 100 MILLIGRAM(S): at 20:22

## 2024-09-01 RX ADMIN — Medication 1 PATCH: at 12:06

## 2024-09-02 RX ADMIN — Medication 100 MILLIGRAM(S): at 20:55

## 2024-09-02 RX ADMIN — Medication 1 PATCH: at 10:56

## 2024-09-03 PROCEDURE — 99232 SBSQ HOSP IP/OBS MODERATE 35: CPT

## 2024-09-03 RX ADMIN — Medication 100 MILLIGRAM(S): at 20:46

## 2024-09-03 NOTE — BH INPATIENT PSYCHIATRY PROGRESS NOTE - NSBHASSESSSUMMFT_PSY_ALL_CORE
Patient is a 49 year old, male; domicile alone; single; noncaregiver; unemployed; PPH of depression; two previous hospitalizations (discharged today from [facility]) ; one past suicide attempt by overdose over 10 years ago; remote history of violence or arrests; h/o ETOH and PCP abuse ( reports having one beer today); no known h/o complicated withdrawal; PMH of arthritis to right knee; brought in by EMS; called by aunt for suicidal ideation.   He was discharged from another inpatient service on the day of admission.  Utox +etoh (98) and PCP.  Differential includes mdd, severe and substance induced mood disorder.      Pt. reports mood improving, denies AVH, delusions, SI/HI. Sleep improved. D/max Wellbutrin d/t reported irritability.     1. Continue 913 hospitalization for stabilization and safety  2. haldol, ativan and benadryl prns away from alcohol while on other inpatient services, one day of drinking, withdrawal seizures unlikely  3. routine checks--reports feeling safe on unit, will notify staff if suicidality returns, no CO indicated.  4. d/c wellbutrin  mg d/t reports increased irritability, Trazodone 100mg QHS for insomnia and 50mg prn  5.  arthritic knee pain--naproxen prn, miralax prn constipation, maalox prn  6. smoking cessation--declines patch will write for gum  7. group/ milieu therapy  8. Dispo: awaiting inpatient rehab, had interview with ck post on 9/3

## 2024-09-04 PROCEDURE — 99232 SBSQ HOSP IP/OBS MODERATE 35: CPT

## 2024-09-04 RX ADMIN — Medication 1 PATCH: at 16:09

## 2024-09-04 RX ADMIN — Medication 100 MILLIGRAM(S): at 20:02

## 2024-09-04 NOTE — BH INPATIENT PSYCHIATRY PROGRESS NOTE - NSBHASSESSSUMMFT_PSY_ALL_CORE
Patient is a 49 year old, male; domicile alone; single; noncaregiver; unemployed; PPH of depression; two previous hospitalizations (discharged today from [facility]) ; one past suicide attempt by overdose over 10 years ago; remote history of violence or arrests; h/o ETOH and PCP abuse ( reports having one beer today); no known h/o complicated withdrawal; PMH of arthritis to right knee; brought in by EMS; called by aunt for suicidal ideation.   He was discharged from another inpatient service on the day of admission.  Utox +etoh (98) and PCP.  Differential includes mdd, severe and substance induced mood disorder.      Patient reports mood improving, denies AVH, delusions, SI/HI. Sleep improved.  Remains focused on treatment with rehab being pursued.     1. Continue 913 hospitalization for stabilization and safety  2. haldol, ativan and benadryl prns away from alcohol while on other inpatient services, one day of drinking, withdrawal seizures unlikely  3. routine checks--reports feeling safe on unit, will notify staff if suicidality returns, no CO indicated.  4. d/c wellbutrin  mg d/t reports increased irritability, Trazodone 100mg QHS for insomnia and 50mg prn  5.  arthritic knee pain--naproxen prn, miralax prn constipation, maalox prn  6. smoking cessation--declines patch will write for gum  7. group/ milieu therapy  8. Dispo: awaiting inpatient rehab, had interview with ck post on 9/3, awaiting [facility].

## 2024-09-05 PROCEDURE — 99232 SBSQ HOSP IP/OBS MODERATE 35: CPT

## 2024-09-05 RX ORDER — IBUPROFEN 600 MG
400 TABLET ORAL EVERY 6 HOURS
Refills: 0 | Status: DISCONTINUED | OUTPATIENT
Start: 2024-09-05 | End: 2024-09-11

## 2024-09-05 RX ADMIN — Medication 100 MILLIGRAM(S): at 20:20

## 2024-09-05 RX ADMIN — Medication 400 MILLIGRAM(S): at 12:09

## 2024-09-05 RX ADMIN — Medication 1 PATCH: at 07:33

## 2024-09-05 NOTE — BH INPATIENT PSYCHIATRY PROGRESS NOTE - NSBHASSESSSUMMFT_PSY_ALL_CORE
Patient is a 49 year old, male; domicile alone; single; noncaregiver; unemployed; PPH of depression; two previous hospitalizations (discharged today from [facility]) ; one past suicide attempt by overdose over 10 years ago; remote history of violence or arrests; h/o ETOH and PCP abuse ( reports having one beer today); no known h/o complicated withdrawal; PMH of arthritis to right knee; brought in by EMS; called by aunt for suicidal ideation.   He was discharged from another inpatient service on the day of admission.  Utox +etoh (98) and PCP.  Differential includes mdd, severe and substance induced mood disorder.      Patient reports mood improving, denies AVH, delusions, SI/HI. Sleep improved.  Remains focused on treatment with rehab being pursued.     1. Continue 913 hospitalization for stabilization and safety  2. haldol, ativan and benadryl prns away from alcohol while on other inpatient services, one day of drinking, withdrawal seizures unlikely  3. routine checks--reports feeling safe on unit, will notify staff if suicidality returns, no CO indicated.  4. d/c wellbutrin  mg d/t reports increased irritability, Trazodone 100mg QHS for insomnia and 50mg prn  5.  arthritic knee pain--naproxen prn, miralax prn constipation, maalox prn  6. smoking cessation--declines patch will write for gum  7. group/ milieu therapy  8. Dispo: awaiting inpatient rehab, had interview with ck post on 9/3, awaiting [facility]. Patient is a 49 year old, male; domicile alone; single; noncaregiver; unemployed; PPH of depression; two previous hospitalizations (discharged today from [facility]) ; one past suicide attempt by overdose over 10 years ago; remote history of violence or arrests; h/o ETOH and PCP abuse ( reports having one beer today); no known h/o complicated withdrawal; PMH of arthritis to right knee; brought in by EMS; called by aunt for suicidal ideation.   He was discharged from another inpatient service on the day of admission.  Utox +etoh (98) and PCP.  Differential includes mdd, severe and substance induced mood disorder.      Patient reports mood improving, denies AVH, delusions, SI/HI. Sleep improved.  Remains focused on treatment with rehab being pursued.     1. Continue 913 hospitalization for stabilization and safety  2. haldol, ativan and benadryl prns away from alcohol while on other inpatient services, one day of drinking, withdrawal seizures unlikely  3. routine checks--reports feeling safe on unit, will notify staff if suicidality returns, no CO indicated.  4. d/c wellbutrin  mg d/t reports increased irritability, Trazodone 100mg QHS for insomnia and 50mg prn  5.  arthritic knee pain-- ordered ibuprofen prn, miralax prn constipation, maalox prn  6. smoking cessation--declines patch will write for gum  7. group/ milieu therapy  8. Dispo: awaiting inpatient rehab, had interview with ck post on 9/3, awaiting [facility].

## 2024-09-06 PROCEDURE — 99232 SBSQ HOSP IP/OBS MODERATE 35: CPT

## 2024-09-06 RX ADMIN — Medication 100 MILLIGRAM(S): at 20:23

## 2024-09-06 RX ADMIN — Medication 1 PATCH: at 08:13

## 2024-09-06 NOTE — BH INPATIENT PSYCHIATRY PROGRESS NOTE - NSBHASSESSSUMMFT_PSY_ALL_CORE
Patient is a 49 year old, male; domicile alone; single; noncaregiver; unemployed; PPH of depression; two previous hospitalizations (discharged today from [facility]) ; one past suicide attempt by overdose over 10 years ago; remote history of violence or arrests; h/o ETOH and PCP abuse ( reports having one beer today); no known h/o complicated withdrawal; PMH of arthritis to right knee; brought in by EMS; called by aunt for suicidal ideation.   He was discharged from another inpatient service on the day of admission.  Utox +etoh (98) and PCP.  Differential includes mdd, severe and substance induced mood disorder.      Patient reports mood improving, denies AVH, delusions, SI/HI. Sleep disrupted last night.  Remains focused on treatment with rehab being pursued.     1. Continue 913 hospitalization for stabilization and safety  2. haldol, ativan and benadryl prns away from alcohol while on other inpatient services, one day of drinking, withdrawal seizures unlikely  3. routine checks--reports feeling safe on unit, will notify staff if suicidality returns, no CO indicated.  4. d/c wellbutrin  mg d/t reports increased irritability, Trazodone 100mg QHS for insomnia and 50mg prn  5.  arthritic knee pain-- ordered ibuprofen prn, miralax prn constipation, maalox prn  6. smoking cessation--declines patch will write for gum  7. group/ milieu therapy  8. Dispo: awaiting inpatient rehab, had interview with ck post on 9/3, awaiting [facility].

## 2024-09-07 RX ORDER — DIPHENHYDRAMINE HCL 50 MG
50 CAPSULE ORAL ONCE
Refills: 0 | Status: DISCONTINUED | OUTPATIENT
Start: 2024-09-07 | End: 2024-09-11

## 2024-09-07 RX ADMIN — Medication 100 MILLIGRAM(S): at 20:25

## 2024-09-08 RX ADMIN — Medication 1 PATCH: at 16:30

## 2024-09-08 RX ADMIN — Medication 100 MILLIGRAM(S): at 20:07

## 2024-09-09 PROCEDURE — 99232 SBSQ HOSP IP/OBS MODERATE 35: CPT

## 2024-09-09 RX ADMIN — Medication 1 PATCH: at 11:31

## 2024-09-09 RX ADMIN — Medication 100 MILLIGRAM(S): at 20:56

## 2024-09-09 NOTE — BH INPATIENT PSYCHIATRY PROGRESS NOTE - NSBHASSESSSUMMFT_PSY_ALL_CORE
Patient is a 49 year old, male; domicile alone; single; noncaregiver; unemployed; PPH of depression; two previous hospitalizations (discharged today from [facility]) ; one past suicide attempt by overdose over 10 years ago; remote history of violence or arrests; h/o ETOH and PCP abuse ( reports having one beer today); no known h/o complicated withdrawal; PMH of arthritis to right knee; brought in by EMS; called by aunt for suicidal ideation.   He was discharged from another inpatient service on the day of admission.  Utox +etoh (98) and PCP.  Differential includes mdd, severe and substance induced mood disorder.      Patient reports mood improving, denies AVH, delusions, SI/HI. Sleep improved last night.  Remains focused on treatment with rehab bed for 9/11.     1. Continue 913 hospitalization for stabilization and safety  2. haldol, ativan and benadryl prns away from alcohol while on other inpatient services, one day of drinking, withdrawal seizures unlikely  3. routine checks--reports feeling safe on unit, will notify staff if suicidality returns, no CO indicated.  4. d/c wellbutrin  mg d/t reports increased irritability, Trazodone 100mg QHS for insomnia and 50mg prn  5.  arthritic knee pain-- ordered ibuprofen prn, miralax prn constipation, maalox prn  6. smoking cessation--declines patch will write for gum  7. group/ milieu therapy  8. Dispo: awaiting inpatient rehab, had interview with ck post on 9/3, awaiting [facility] bed with dc plan for 9/11.

## 2024-09-10 PROCEDURE — 99232 SBSQ HOSP IP/OBS MODERATE 35: CPT

## 2024-09-10 RX ORDER — TRAZODONE HCL 50 MG
1 TABLET ORAL
Qty: 30 | Refills: 0
Start: 2024-09-10 | End: 2024-10-09

## 2024-09-10 RX ORDER — LIDOCAINE/BENZALKONIUM/ALCOHOL
1 SOLUTION, NON-ORAL TOPICAL
Qty: 30 | Refills: 0
Start: 2024-09-10

## 2024-09-10 RX ORDER — IBUPROFEN 600 MG
1 TABLET ORAL
Qty: 0 | Refills: 0 | DISCHARGE
Start: 2024-09-10

## 2024-09-10 RX ADMIN — Medication 100 MILLIGRAM(S): at 20:28

## 2024-09-10 RX ADMIN — Medication 1 PATCH: at 11:51

## 2024-09-10 NOTE — BH INPATIENT PSYCHIATRY DISCHARGE NOTE - NSDCMRMEDTOKEN_GEN_ALL_CORE_FT
ibuprofen 400 mg oral tablet: 1 tab(s) orally every 6 hours As needed Mild Pain (1 - 3), Moderate Pain (4 - 6)  lidocaine 4% topical film: Apply topically to affected area once a day as needed for  moderate pain  traZODone 100 mg oral tablet: 1 tab(s) orally once a day (at bedtime)

## 2024-09-10 NOTE — BH INPATIENT PSYCHIATRY DISCHARGE NOTE - OTHER PAST PSYCHIATRIC HISTORY (INCLUDE DETAILS REGARDING ONSET, COURSE OF ILLNESS, INPATIENT/OUTPATIENT TREATMENT)
Pt is a 48 y/o single male, non-caregiver, domiciled alone, with a history of depression and 1 documented suicide attempt 10+ years ago by OD, resulting in a medical admission. Pt came to the ED via EMS the same day of his discharge from [facility], after going to his aunt's house and asking her to call 911. Pt had returned to his apartment, where he has no electricity, and suicidal thoughts returned. Pt has a history of alcohol abuse and PCP use. He was sober x 10 years but relapsed 1.5 years ago. 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 Currently reporting feeling helpless and hopeless about his future.

## 2024-09-10 NOTE — BH INPATIENT PSYCHIATRY PROGRESS NOTE - NSTXSUBMISGOAL_PSY_ALL_CORE
Other...
Will develop a relapse prevention plan
Be able to acknowledge that substance abuse is a problem
Other...
Will develop a relapse prevention plan
Accept referral for substance abuse treatment on discharge
Other...
Will develop a relapse prevention plan
Other...
Will develop a relapse prevention plan
Will verbalize 3 adverse consequences of use
Be able to acknowledge that substance abuse is a problem
Will develop a relapse prevention plan
Other...
Will develop a relapse prevention plan
Accept referral for substance abuse treatment on discharge
Other...
Be able to acknowledge that substance abuse is a problem
Other...
Other...

## 2024-09-10 NOTE — BH INPATIENT PSYCHIATRY PROGRESS NOTE - NSBHATTESTTYPEVISIT_PSY_A_CORE
Attending Only
Attending Only
CHUCKIE without on-site Attending supervision
CHUCKIE without on-site Attending supervision
Attending Only
Attending Only
CHUCKIE without on-site Attending supervision
Attending Only

## 2024-09-10 NOTE — BH INPATIENT PSYCHIATRY PROGRESS NOTE - NSTXCOPEDATETRGT_PSY_ALL_CORE
05-Sep-2024
21-Aug-2024
30-Aug-2024
05-Sep-2024
21-Aug-2024
05-Sep-2024
21-Aug-2024
21-Aug-2024
30-Aug-2024
05-Sep-2024
05-Sep-2024
21-Aug-2024
05-Sep-2024
21-Aug-2024
30-Aug-2024

## 2024-09-10 NOTE — BH INPATIENT PSYCHIATRY PROGRESS NOTE - NSBHMETABOLIC_PSY_ALL_CORE_FT
BMI: BMI (kg/m2): 27 (08-14-24 @ 13:01)  HbA1c:   Glucose:   BP: --Vital Signs Last 24 Hrs  T(C): 36.7 (09-06-24 @ 08:25), Max: 36.7 (09-06-24 @ 08:25)  T(F): 98 (09-06-24 @ 08:25), Max: 98 (09-06-24 @ 08:25)  HR: --  BP: --  BP(mean): --  RR: --  SpO2: --    Orthostatic VS  09-06-24 @ 08:25  Lying BP: --/-- HR: --  Sitting BP: 111/68 HR: 94  Standing BP: 129/80 HR: 94  Site: --  Mode: --  Orthostatic VS  09-05-24 @ 08:02  Lying BP: --/-- HR: --  Sitting BP: 111/80 HR: 72  Standing BP: 109/80 HR: 85  Site: --  Mode: --    Lipid Panel: 
BMI: BMI (kg/m2): 27 (08-14-24 @ 13:01)  HbA1c:   Glucose:   BP: --Vital Signs Last 24 Hrs  T(C): 36.5 (09-04-24 @ 08:17), Max: 36.5 (09-04-24 @ 08:17)  T(F): 97.7 (09-04-24 @ 08:17), Max: 97.7 (09-04-24 @ 08:17)  HR: --  BP: --  BP(mean): --  RR: 18 (09-04-24 @ 08:17) (18 - 18)  SpO2: --    Orthostatic VS  09-04-24 @ 08:17  Lying BP: --/-- HR: --  Sitting BP: 101/70 HR: 60  Standing BP: 98/72 HR: 62  Site: --  Mode: --  Orthostatic VS  09-03-24 @ 08:17  Lying BP: --/-- HR: --  Sitting BP: 126/99 HR: 100  Standing BP: 114/83 HR: 97  Site: --  Mode: --    Lipid Panel: 
BMI: BMI (kg/m2): 27 (08-14-24 @ 13:01)  HbA1c:   Glucose:   BP: --Vital Signs Last 24 Hrs  T(C): 36.4 (08-27-24 @ 08:24), Max: 36.4 (08-27-24 @ 08:24)  T(F): 97.6 (08-27-24 @ 08:24), Max: 97.6 (08-27-24 @ 08:24)  HR: --  BP: --  BP(mean): --  RR: --  SpO2: --    Orthostatic VS  08-27-24 @ 08:24  Lying BP: --/-- HR: --  Sitting BP: 109/68 HR: 72  Standing BP: 98/62 HR: 97  Site: upper left arm  Mode: electronic  Orthostatic VS  08-26-24 @ 08:15  Lying BP: --/-- HR: --  Sitting BP: 108/78 HR: 70  Standing BP: 110/74 HR: 77  Site: --  Mode: --    Lipid Panel: 
BMI: BMI (kg/m2): 27 (08-14-24 @ 13:01)  HbA1c:   Glucose:   BP: --Vital Signs Last 24 Hrs  T(C): 36.7 (08-20-24 @ 08:34), Max: 36.7 (08-20-24 @ 08:34)  T(F): 98 (08-20-24 @ 08:34), Max: 98 (08-20-24 @ 08:34)  HR: --  BP: --  BP(mean): --  RR: --  SpO2: --    Orthostatic VS  08-20-24 @ 08:34  Lying BP: --/-- HR: --  Sitting BP: 110/66 HR: 73  Standing BP: 106/75 HR: 76  Site: --  Mode: --  Orthostatic VS  08-19-24 @ 08:21  Lying BP: --/-- HR: --  Sitting BP: 115/90 HR: 68  Standing BP: 118/80 HR: 79  Site: --  Mode: --  Orthostatic VS  08-18-24 @ 19:12  Lying BP: --/-- HR: --  Sitting BP: 114/76 HR: 65  Standing BP: --/-- HR: --  Site: --  Mode: --    Lipid Panel: 
BMI: BMI (kg/m2): 27 (08-14-24 @ 13:01)  HbA1c:   Glucose:   BP: --Vital Signs Last 24 Hrs  T(C): 36.5 (08-26-24 @ 08:15), Max: 36.5 (08-26-24 @ 08:15)  T(F): 97.7 (08-26-24 @ 08:15), Max: 97.7 (08-26-24 @ 08:15)  HR: --  BP: --  BP(mean): --  RR: 18 (08-26-24 @ 08:15) (18 - 18)  SpO2: --    Orthostatic VS  08-26-24 @ 08:15  Lying BP: --/-- HR: --  Sitting BP: 108/78 HR: 70  Standing BP: 110/74 HR: 77  Site: --  Mode: --  Orthostatic VS  08-25-24 @ 08:29  Lying BP: --/-- HR: --  Sitting BP: 102/75 HR: 64  Standing BP: 114/80 HR: 55  Site: --  Mode: --    Lipid Panel: 
BMI: BMI (kg/m2): 27 (08-14-24 @ 13:01)  HbA1c:   Glucose:   BP: --Vital Signs Last 24 Hrs  T(C): 36.4 (09-09-24 @ 08:09), Max: 36.4 (09-09-24 @ 08:09)  T(F): 97.6 (09-09-24 @ 08:09), Max: 97.6 (09-09-24 @ 08:09)  HR: --  BP: --  BP(mean): --  RR: --  SpO2: --    Orthostatic VS  09-09-24 @ 08:09  Lying BP: --/-- HR: --  Sitting BP: 132/82 HR: 65  Standing BP: 137/91 HR: 70  Site: --  Mode: --  Orthostatic VS  09-08-24 @ 07:59  Lying BP: --/-- HR: --  Sitting BP: 116/83 HR: 71  Standing BP: 121/85 HR: 78  Site: --  Mode: --    Lipid Panel: 
BMI: BMI (kg/m2): 27 (08-14-24 @ 13:01)  HbA1c:   Glucose:   BP: --Vital Signs Last 24 Hrs  T(C): 36.7 (08-30-24 @ 08:12), Max: 36.7 (08-30-24 @ 08:12)  T(F): 98.1 (08-30-24 @ 08:12), Max: 98.1 (08-30-24 @ 08:12)  HR: --  BP: --  BP(mean): --  RR: --  SpO2: --    Orthostatic VS  08-30-24 @ 08:12  Lying BP: --/-- HR: --  Sitting BP: 105/74 HR: 85  Standing BP: 101/65 HR: 111  Site: --  Mode: --  Orthostatic VS  08-29-24 @ 08:02  Lying BP: --/-- HR: --  Sitting BP: 110/76 HR: 87  Standing BP: 122/70 HR: 107  Site: --  Mode: --    Lipid Panel: 
BMI: BMI (kg/m2): 27 (08-14-24 @ 13:01)  HbA1c:   Glucose:   BP: --Vital Signs Last 24 Hrs  T(C): 36.4 (08-28-24 @ 07:48), Max: 36.4 (08-28-24 @ 07:48)  T(F): 97.6 (08-28-24 @ 07:48), Max: 97.6 (08-28-24 @ 07:48)  HR: --  BP: --  BP(mean): --  RR: --  SpO2: --    Orthostatic VS  08-28-24 @ 07:48  Lying BP: --/-- HR: --  Sitting BP: 120/72 HR: 69  Standing BP: 122/72 HR: 78  Site: --  Mode: --  Orthostatic VS  08-27-24 @ 08:24  Lying BP: --/-- HR: --  Sitting BP: 109/68 HR: 72  Standing BP: 98/62 HR: 97  Site: upper left arm  Mode: electronic    Lipid Panel: 
BMI: BMI (kg/m2): 27 (08-14-24 @ 13:01)  HbA1c:   Glucose:   BP: --Vital Signs Last 24 Hrs  T(C): 36.6 (09-03-24 @ 08:17), Max: 36.6 (09-03-24 @ 08:17)  T(F): 97.8 (09-03-24 @ 08:17), Max: 97.8 (09-03-24 @ 08:17)  HR: --  BP: --  BP(mean): --  RR: --  SpO2: --    Orthostatic VS  09-03-24 @ 08:17  Lying BP: --/-- HR: --  Sitting BP: 126/99 HR: 100  Standing BP: 114/83 HR: 97  Site: --  Mode: --  Orthostatic VS  09-02-24 @ 07:57  Lying BP: --/-- HR: --  Sitting BP: 120/88 HR: 81  Standing BP: 109/84 HR: 104  Site: --  Mode: --    Lipid Panel: 
BMI: BMI (kg/m2): 27 (08-14-24 @ 13:01)  HbA1c:   Glucose:   BP: --Vital Signs Last 24 Hrs  T(C): 36.5 (09-10-24 @ 08:09), Max: 36.5 (09-10-24 @ 08:09)  T(F): 97.7 (09-10-24 @ 08:09), Max: 97.7 (09-10-24 @ 08:09)  HR: --  BP: --  BP(mean): --  RR: --  SpO2: --    Orthostatic VS  09-10-24 @ 08:09  Lying BP: --/-- HR: --  Sitting BP: 115/73 HR: 79  Standing BP: 108/85 HR: 110  Site: upper left arm  Mode: electronic  Orthostatic VS  09-09-24 @ 08:09  Lying BP: --/-- HR: --  Sitting BP: 132/82 HR: 65  Standing BP: 137/91 HR: 70  Site: --  Mode: --    Lipid Panel: 
BMI: BMI (kg/m2): 27 (08-14-24 @ 13:01)  HbA1c:   Glucose:   BP: --Vital Signs Last 24 Hrs  T(C): 36.5 (08-23-24 @ 08:14), Max: 36.5 (08-23-24 @ 08:14)  T(F): 97.7 (08-23-24 @ 08:14), Max: 97.7 (08-23-24 @ 08:14)  HR: --  BP: --  BP(mean): --  RR: --  SpO2: --    Orthostatic VS  08-23-24 @ 08:14  Lying BP: --/-- HR: --  Sitting BP: 103/66 HR: 82  Standing BP: 101/54 HR: 91  Site: --  Mode: --  Orthostatic VS  08-22-24 @ 08:27  Lying BP: --/-- HR: --  Sitting BP: 106/73 HR: 68  Standing BP: 115/86 HR: 80  Site: --  Mode: --    Lipid Panel: 
BMI: BMI (kg/m2): 27 (08-14-24 @ 13:01)  HbA1c:   Glucose:   BP: 116/78 (08-14-24 @ 12:48) (116/78 - 124/78)Vital Signs Last 24 Hrs  T(C): 36.6 (08-15-24 @ 07:53), Max: 37.2 (08-14-24 @ 19:27)  T(F): 97.8 (08-15-24 @ 07:53), Max: 99 (08-14-24 @ 19:27)  HR: --  BP: --  BP(mean): --  RR: --  SpO2: --    Orthostatic VS  08-15-24 @ 07:53  Lying BP: --/-- HR: --  Sitting BP: 132/88 HR: 69  Standing BP: 125/83 HR: 81  Site: --  Mode: --  Orthostatic VS  08-14-24 @ 19:27  Lying BP: --/-- HR: --  Sitting BP: 122/71 HR: 72  Standing BP: 115/70 HR: 87  Site: --  Mode: --  Orthostatic VS  08-14-24 @ 13:01  Lying BP: --/-- HR: --  Sitting BP: 113/79 HR: 76  Standing BP: 121/83 HR: 80  Site: --  Mode: --    Lipid Panel: 
BMI: BMI (kg/m2): 27 (08-14-24 @ 13:01)  HbA1c:   Glucose:   BP: --Vital Signs Last 24 Hrs  T(C): 36.8 (08-22-24 @ 08:27), Max: 36.8 (08-22-24 @ 08:27)  T(F): 98.2 (08-22-24 @ 08:27), Max: 98.2 (08-22-24 @ 08:27)  HR: --  BP: --  BP(mean): --  RR: --  SpO2: --    Orthostatic VS  08-22-24 @ 08:27  Lying BP: --/-- HR: --  Sitting BP: 106/73 HR: 68  Standing BP: 115/86 HR: 80  Site: --  Mode: --  Orthostatic VS  08-21-24 @ 08:08  Lying BP: --/-- HR: --  Sitting BP: 104/72 HR: 64  Standing BP: 100/68 HR: 71  Site: --  Mode: --    Lipid Panel: 
BMI: BMI (kg/m2): 27 (08-14-24 @ 13:01)  HbA1c:   Glucose:   BP: 116/78 (08-14-24 @ 12:48) (116/78 - 124/78)Vital Signs Last 24 Hrs  T(C): 36.8 (08-16-24 @ 08:12), Max: 36.9 (08-15-24 @ 18:40)  T(F): 98.3 (08-16-24 @ 08:12), Max: 98.4 (08-15-24 @ 18:40)  HR: --  BP: --  BP(mean): --  RR: --  SpO2: --    Orthostatic VS  08-16-24 @ 08:12  Lying BP: 115/83 HR: 67  Sitting BP: 124/89 HR: 69  Standing BP: --/-- HR: --  Site: --  Mode: --  Orthostatic VS  08-15-24 @ 18:40  Lying BP: --/-- HR: --  Sitting BP: 112/69 HR: 777  Standing BP: 117/84 HR: --  Site: --  Mode: --  Orthostatic VS  08-15-24 @ 07:53  Lying BP: --/-- HR: --  Sitting BP: 132/88 HR: 69  Standing BP: 125/83 HR: 81  Site: --  Mode: --  Orthostatic VS  08-14-24 @ 19:27  Lying BP: --/-- HR: --  Sitting BP: 122/71 HR: 72  Standing BP: 115/70 HR: 87  Site: --  Mode: --  Orthostatic VS  08-14-24 @ 13:01  Lying BP: --/-- HR: --  Sitting BP: 113/79 HR: 76  Standing BP: 121/83 HR: 80  Site: --  Mode: --    Lipid Panel: 
BMI: BMI (kg/m2): 27 (08-14-24 @ 13:01)  HbA1c:   Glucose:   BP: --Vital Signs Last 24 Hrs  T(C): 36.8 (08-18-24 @ 08:25), Max: 36.8 (08-17-24 @ 19:01)  T(F): 98.2 (08-18-24 @ 08:25), Max: 98.2 (08-17-24 @ 19:01)  HR: --  BP: --  BP(mean): --  RR: --  SpO2: --    Orthostatic VS  08-18-24 @ 08:25  Lying BP: --/-- HR: --  Sitting BP: 119/70 HR: 64  Standing BP: 123/90 HR: 75  Site: --  Mode: --  Orthostatic VS  08-17-24 @ 19:01  Lying BP: --/-- HR: --  Sitting BP: 133/64 HR: 65  Standing BP: 114/71 HR: 70  Site: --  Mode: --  Orthostatic VS  08-17-24 @ 08:18  Lying BP: --/-- HR: --  Sitting BP: 121/86 HR: 69  Standing BP: 121/89 HR: 75  Site: --  Mode: --  Orthostatic VS  08-16-24 @ 20:09  Lying BP: --/-- HR: --  Sitting BP: 111/74 HR: 69  Standing BP: 108/76 HR: 79  Site: --  Mode: --    Lipid Panel: 
BMI: BMI (kg/m2): 27 (08-14-24 @ 13:01)  HbA1c:   Glucose:   BP: --Vital Signs Last 24 Hrs  T(C): 36.5 (08-29-24 @ 08:02), Max: 36.5 (08-29-24 @ 08:02)  T(F): 97.7 (08-29-24 @ 08:02), Max: 97.7 (08-29-24 @ 08:02)  HR: --  BP: --  BP(mean): --  RR: --  SpO2: --    Orthostatic VS  08-29-24 @ 08:02  Lying BP: --/-- HR: --  Sitting BP: 110/76 HR: 87  Standing BP: 122/70 HR: 107  Site: --  Mode: --  Orthostatic VS  08-28-24 @ 07:48  Lying BP: --/-- HR: --  Sitting BP: 120/72 HR: 69  Standing BP: 122/72 HR: 78  Site: --  Mode: --    Lipid Panel: 
BMI: BMI (kg/m2): 27 (08-14-24 @ 13:01)  HbA1c:   Glucose:   BP: --Vital Signs Last 24 Hrs  T(C): 36.6 (09-05-24 @ 08:02), Max: 36.6 (09-05-24 @ 08:02)  T(F): 97.8 (09-05-24 @ 08:02), Max: 97.8 (09-05-24 @ 08:02)  HR: --  BP: --  BP(mean): --  RR: --  SpO2: --    Orthostatic VS  09-05-24 @ 08:02  Lying BP: --/-- HR: --  Sitting BP: 111/80 HR: 72  Standing BP: 109/80 HR: 85  Site: --  Mode: --  Orthostatic VS  09-04-24 @ 08:17  Lying BP: --/-- HR: --  Sitting BP: 101/70 HR: 60  Standing BP: 98/72 HR: 62  Site: --  Mode: --    Lipid Panel: 
BMI: BMI (kg/m2): 27 (08-14-24 @ 13:01)  HbA1c:   Glucose:   BP: --Vital Signs Last 24 Hrs  T(C): 36.4 (08-19-24 @ 08:21), Max: 36.7 (08-18-24 @ 19:12)  T(F): 97.5 (08-19-24 @ 08:21), Max: 98 (08-18-24 @ 19:12)  HR: --  BP: --  BP(mean): --  RR: --  SpO2: --    Orthostatic VS  08-19-24 @ 08:21  Lying BP: --/-- HR: --  Sitting BP: 115/90 HR: 68  Standing BP: 118/80 HR: 79  Site: --  Mode: --  Orthostatic VS  08-18-24 @ 19:12  Lying BP: --/-- HR: --  Sitting BP: 114/76 HR: 65  Standing BP: --/-- HR: --  Site: --  Mode: --  Orthostatic VS  08-18-24 @ 08:25  Lying BP: --/-- HR: --  Sitting BP: 119/70 HR: 64  Standing BP: 123/90 HR: 75  Site: --  Mode: --  Orthostatic VS  08-17-24 @ 19:01  Lying BP: --/-- HR: --  Sitting BP: 133/64 HR: 65  Standing BP: 114/71 HR: 70  Site: --  Mode: --    Lipid Panel: 
BMI: BMI (kg/m2): 27 (08-14-24 @ 13:01)  HbA1c:   Glucose:   BP: --Vital Signs Last 24 Hrs  T(C): 36.3 (08-21-24 @ 08:08), Max: 36.3 (08-21-24 @ 08:08)  T(F): 97.4 (08-21-24 @ 08:08), Max: 97.4 (08-21-24 @ 08:08)  HR: --  BP: --  BP(mean): --  RR: 18 (08-21-24 @ 08:08) (18 - 18)  SpO2: --    Orthostatic VS  08-21-24 @ 08:08  Lying BP: --/-- HR: --  Sitting BP: 104/72 HR: 64  Standing BP: 100/68 HR: 71  Site: --  Mode: --  Orthostatic VS  08-20-24 @ 08:34  Lying BP: --/-- HR: --  Sitting BP: 110/66 HR: 73  Standing BP: 106/75 HR: 76  Site: --  Mode: --    Lipid Panel: 
BMI: BMI (kg/m2): 27 (08-14-24 @ 13:01)  HbA1c:   Glucose:   BP: 116/78 (08-14-24 @ 12:48) (116/78 - 116/78)Vital Signs Last 24 Hrs  T(C): 36.7 (08-17-24 @ 08:18), Max: 36.7 (08-16-24 @ 20:09)  T(F): 98 (08-17-24 @ 08:18), Max: 98.1 (08-16-24 @ 20:09)  HR: --  BP: --  BP(mean): --  RR: --  SpO2: --    Orthostatic VS  08-17-24 @ 08:18  Lying BP: --/-- HR: --  Sitting BP: 121/86 HR: 69  Standing BP: 121/89 HR: 75  Site: --  Mode: --  Orthostatic VS  08-16-24 @ 20:09  Lying BP: --/-- HR: --  Sitting BP: 111/74 HR: 69  Standing BP: 108/76 HR: 79  Site: --  Mode: --  Orthostatic VS  08-16-24 @ 08:12  Lying BP: 115/83 HR: 67  Sitting BP: 124/89 HR: 69  Standing BP: --/-- HR: --  Site: --  Mode: --  Orthostatic VS  08-15-24 @ 18:40  Lying BP: --/-- HR: --  Sitting BP: 112/69 HR: 777  Standing BP: 117/84 HR: --  Site: --  Mode: --    Lipid Panel:

## 2024-09-10 NOTE — BH INPATIENT PSYCHIATRY PROGRESS NOTE - CURRENT MEDICATION
MEDICATIONS  (STANDING):  traZODone 100 milliGRAM(s) Oral at bedtime    MEDICATIONS  (PRN):  aluminum hydroxide/magnesium hydroxide/simethicone Suspension 30 milliLiter(s) Oral every 6 hours PRN Dyspepsia  hydrOXYzine hydrochloride 50 milliGRAM(s) Oral every 6 hours PRN acute anxiety  LORazepam   Injectable 2 milliGRAM(s) IntraMuscular once PRN Agitation  naproxen 500 milliGRAM(s) Oral every 12 hours PRN pain  nicotine  Polacrilex Gum 2 milliGRAM(s) Oral every 2 hours PRN Smoking Cessation  polyethylene glycol 3350 17 Gram(s) Oral two times a day PRN Constipation  traZODone 50 milliGRAM(s) Oral at bedtime PRN insomnia  
MEDICATIONS  (STANDING):  traZODone 100 milliGRAM(s) Oral at bedtime    MEDICATIONS  (PRN):  aluminum hydroxide/magnesium hydroxide/simethicone Suspension 30 milliLiter(s) Oral every 6 hours PRN Dyspepsia  hydrOXYzine hydrochloride 50 milliGRAM(s) Oral every 6 hours PRN acute anxiety  ibuprofen  Tablet. 400 milliGRAM(s) Oral every 8 hours PRN Moderate Pain (4 - 6)  lidocaine   4% Patch 1 Patch Transdermal daily PRN moderate pain  nicotine  Polacrilex Gum 2 milliGRAM(s) Oral every 2 hours PRN Smoking Cessation  polyethylene glycol 3350 17 Gram(s) Oral two times a day PRN Constipation  traZODone 50 milliGRAM(s) Oral at bedtime PRN insomnia  
MEDICATIONS  (STANDING):  buPROPion XL (24-Hour) 150 milliGRAM(s) Oral daily  traZODone 100 milliGRAM(s) Oral at bedtime    MEDICATIONS  (PRN):  aluminum hydroxide/magnesium hydroxide/simethicone Suspension 30 milliLiter(s) Oral every 6 hours PRN Dyspepsia  hydrOXYzine hydrochloride 50 milliGRAM(s) Oral every 6 hours PRN acute anxiety  ibuprofen  Tablet. 400 milliGRAM(s) Oral every 8 hours PRN Moderate Pain (4 - 6)  lidocaine   4% Patch 1 Patch Transdermal daily PRN moderate pain  nicotine  Polacrilex Gum 2 milliGRAM(s) Oral every 2 hours PRN Smoking Cessation  polyethylene glycol 3350 17 Gram(s) Oral two times a day PRN Constipation  traZODone 50 milliGRAM(s) Oral at bedtime PRN insomnia  
MEDICATIONS  (STANDING):    MEDICATIONS  (PRN):  hydrOXYzine hydrochloride 50 milliGRAM(s) Oral every 6 hours PRN acute anxiety  LORazepam   Injectable 2 milliGRAM(s) IntraMuscular Once PRN severe agitation  naproxen 500 milliGRAM(s) Oral every 12 hours PRN pain  nicotine  Polacrilex Gum 2 milliGRAM(s) Oral every 2 hours PRN Smoking Cessation  
MEDICATIONS  (STANDING):    MEDICATIONS  (PRN):  hydrOXYzine hydrochloride 50 milliGRAM(s) Oral every 6 hours PRN acute anxiety  LORazepam   Injectable 2 milliGRAM(s) IntraMuscular Once PRN severe agitation  naproxen 500 milliGRAM(s) Oral every 12 hours PRN pain  nicotine  Polacrilex Gum 2 milliGRAM(s) Oral every 2 hours PRN Smoking Cessation  traZODone 50 milliGRAM(s) Oral at bedtime PRN insomnia  
MEDICATIONS  (STANDING):  diphenhydrAMINE 50 milliGRAM(s) Oral once  traZODone 100 milliGRAM(s) Oral at bedtime    MEDICATIONS  (PRN):  aluminum hydroxide/magnesium hydroxide/simethicone Suspension 30 milliLiter(s) Oral every 6 hours PRN Dyspepsia  hydrOXYzine hydrochloride 50 milliGRAM(s) Oral every 6 hours PRN acute anxiety  ibuprofen  Tablet. 400 milliGRAM(s) Oral every 6 hours PRN Mild Pain (1 - 3), Moderate Pain (4 - 6)  lidocaine   4% Patch 1 Patch Transdermal daily PRN moderate pain  nicotine  Polacrilex Gum 2 milliGRAM(s) Oral every 2 hours PRN Smoking Cessation  polyethylene glycol 3350 17 Gram(s) Oral two times a day PRN Constipation  traZODone 50 milliGRAM(s) Oral at bedtime PRN insomnia  
MEDICATIONS  (STANDING):  buPROPion XL (24-Hour) 150 milliGRAM(s) Oral daily  traZODone 100 milliGRAM(s) Oral at bedtime    MEDICATIONS  (PRN):  aluminum hydroxide/magnesium hydroxide/simethicone Suspension 30 milliLiter(s) Oral every 6 hours PRN Dyspepsia  hydrOXYzine hydrochloride 50 milliGRAM(s) Oral every 6 hours PRN acute anxiety  ibuprofen  Tablet. 400 milliGRAM(s) Oral every 8 hours PRN Moderate Pain (4 - 6)  lidocaine   4% Patch 1 Patch Transdermal daily PRN moderate pain  LORazepam   Injectable 2 milliGRAM(s) IntraMuscular once PRN Agitation  nicotine  Polacrilex Gum 2 milliGRAM(s) Oral every 2 hours PRN Smoking Cessation  polyethylene glycol 3350 17 Gram(s) Oral two times a day PRN Constipation  traZODone 50 milliGRAM(s) Oral at bedtime PRN insomnia  
MEDICATIONS  (STANDING):  traZODone 100 milliGRAM(s) Oral at bedtime    MEDICATIONS  (PRN):  aluminum hydroxide/magnesium hydroxide/simethicone Suspension 30 milliLiter(s) Oral every 6 hours PRN Dyspepsia  hydrOXYzine hydrochloride 50 milliGRAM(s) Oral every 6 hours PRN acute anxiety  ibuprofen  Tablet. 400 milliGRAM(s) Oral every 8 hours PRN Moderate Pain (4 - 6)  lidocaine   4% Patch 1 Patch Transdermal daily PRN moderate pain  nicotine  Polacrilex Gum 2 milliGRAM(s) Oral every 2 hours PRN Smoking Cessation  polyethylene glycol 3350 17 Gram(s) Oral two times a day PRN Constipation  traZODone 50 milliGRAM(s) Oral at bedtime PRN insomnia  
MEDICATIONS  (STANDING):    MEDICATIONS  (PRN):  hydrOXYzine hydrochloride 50 milliGRAM(s) Oral every 6 hours PRN acute anxiety  LORazepam   Injectable 2 milliGRAM(s) IntraMuscular Once PRN severe agitation  naproxen 500 milliGRAM(s) Oral every 12 hours PRN pain  nicotine  Polacrilex Gum 2 milliGRAM(s) Oral every 2 hours PRN Smoking Cessation  
MEDICATIONS  (STANDING):  buPROPion XL (24-Hour) 150 milliGRAM(s) Oral daily  traZODone 100 milliGRAM(s) Oral at bedtime    MEDICATIONS  (PRN):  aluminum hydroxide/magnesium hydroxide/simethicone Suspension 30 milliLiter(s) Oral every 6 hours PRN Dyspepsia  hydrOXYzine hydrochloride 50 milliGRAM(s) Oral every 6 hours PRN acute anxiety  LORazepam   Injectable 2 milliGRAM(s) IntraMuscular once PRN Agitation  naproxen 500 milliGRAM(s) Oral every 12 hours PRN pain  nicotine  Polacrilex Gum 2 milliGRAM(s) Oral every 2 hours PRN Smoking Cessation  polyethylene glycol 3350 17 Gram(s) Oral two times a day PRN Constipation  traZODone 50 milliGRAM(s) Oral at bedtime PRN insomnia  
MEDICATIONS  (STANDING):    MEDICATIONS  (PRN):  hydrOXYzine hydrochloride 50 milliGRAM(s) Oral every 6 hours PRN acute anxiety  LORazepam   Injectable 2 milliGRAM(s) IntraMuscular Once PRN severe agitation  naproxen 500 milliGRAM(s) Oral every 12 hours PRN pain  nicotine  Polacrilex Gum 2 milliGRAM(s) Oral every 2 hours PRN Smoking Cessation  
MEDICATIONS  (STANDING):  buPROPion XL (24-Hour) 150 milliGRAM(s) Oral daily  traZODone 100 milliGRAM(s) Oral at bedtime    MEDICATIONS  (PRN):  aluminum hydroxide/magnesium hydroxide/simethicone Suspension 30 milliLiter(s) Oral every 6 hours PRN Dyspepsia  hydrOXYzine hydrochloride 50 milliGRAM(s) Oral every 6 hours PRN acute anxiety  ibuprofen  Tablet. 400 milliGRAM(s) Oral every 8 hours PRN Moderate Pain (4 - 6)  lidocaine   4% Patch 1 Patch Transdermal daily PRN moderate pain  nicotine  Polacrilex Gum 2 milliGRAM(s) Oral every 2 hours PRN Smoking Cessation  polyethylene glycol 3350 17 Gram(s) Oral two times a day PRN Constipation  traZODone 50 milliGRAM(s) Oral at bedtime PRN insomnia  
MEDICATIONS  (STANDING):  buPROPion XL (24-Hour) 150 milliGRAM(s) Oral daily  traZODone 100 milliGRAM(s) Oral at bedtime    MEDICATIONS  (PRN):  aluminum hydroxide/magnesium hydroxide/simethicone Suspension 30 milliLiter(s) Oral every 6 hours PRN Dyspepsia  hydrOXYzine hydrochloride 50 milliGRAM(s) Oral every 6 hours PRN acute anxiety  LORazepam   Injectable 2 milliGRAM(s) IntraMuscular once PRN Agitation  naproxen 500 milliGRAM(s) Oral every 12 hours PRN pain  nicotine  Polacrilex Gum 2 milliGRAM(s) Oral every 2 hours PRN Smoking Cessation  polyethylene glycol 3350 17 Gram(s) Oral two times a day PRN Constipation  traZODone 50 milliGRAM(s) Oral at bedtime PRN insomnia  
MEDICATIONS  (STANDING):  traZODone 100 milliGRAM(s) Oral at bedtime    MEDICATIONS  (PRN):  aluminum hydroxide/magnesium hydroxide/simethicone Suspension 30 milliLiter(s) Oral every 6 hours PRN Dyspepsia  hydrOXYzine hydrochloride 50 milliGRAM(s) Oral every 6 hours PRN acute anxiety  ibuprofen  Tablet. 400 milliGRAM(s) Oral every 8 hours PRN Moderate Pain (4 - 6)  lidocaine   4% Patch 1 Patch Transdermal daily PRN moderate pain  nicotine  Polacrilex Gum 2 milliGRAM(s) Oral every 2 hours PRN Smoking Cessation  polyethylene glycol 3350 17 Gram(s) Oral two times a day PRN Constipation  traZODone 50 milliGRAM(s) Oral at bedtime PRN insomnia  
MEDICATIONS  (STANDING):  traZODone 100 milliGRAM(s) Oral at bedtime    MEDICATIONS  (PRN):  aluminum hydroxide/magnesium hydroxide/simethicone Suspension 30 milliLiter(s) Oral every 6 hours PRN Dyspepsia  hydrOXYzine hydrochloride 50 milliGRAM(s) Oral every 6 hours PRN acute anxiety  ibuprofen  Tablet. 400 milliGRAM(s) Oral every 6 hours PRN Mild Pain (1 - 3), Moderate Pain (4 - 6)  lidocaine   4% Patch 1 Patch Transdermal daily PRN moderate pain  nicotine  Polacrilex Gum 2 milliGRAM(s) Oral every 2 hours PRN Smoking Cessation  polyethylene glycol 3350 17 Gram(s) Oral two times a day PRN Constipation  traZODone 50 milliGRAM(s) Oral at bedtime PRN insomnia  
MEDICATIONS  (STANDING):  buPROPion XL (24-Hour) 150 milliGRAM(s) Oral daily  traZODone 100 milliGRAM(s) Oral at bedtime    MEDICATIONS  (PRN):  aluminum hydroxide/magnesium hydroxide/simethicone Suspension 30 milliLiter(s) Oral every 6 hours PRN Dyspepsia  hydrOXYzine hydrochloride 50 milliGRAM(s) Oral every 6 hours PRN acute anxiety  LORazepam   Injectable 2 milliGRAM(s) IntraMuscular once PRN Agitation  naproxen 500 milliGRAM(s) Oral every 12 hours PRN pain  nicotine  Polacrilex Gum 2 milliGRAM(s) Oral every 2 hours PRN Smoking Cessation  polyethylene glycol 3350 17 Gram(s) Oral two times a day PRN Constipation  traZODone 50 milliGRAM(s) Oral at bedtime PRN insomnia  
MEDICATIONS  (STANDING):  traZODone 100 milliGRAM(s) Oral at bedtime    MEDICATIONS  (PRN):  aluminum hydroxide/magnesium hydroxide/simethicone Suspension 30 milliLiter(s) Oral every 6 hours PRN Dyspepsia  hydrOXYzine hydrochloride 50 milliGRAM(s) Oral every 6 hours PRN acute anxiety  ibuprofen  Tablet. 400 milliGRAM(s) Oral every 6 hours PRN Mild Pain (1 - 3), Moderate Pain (4 - 6)  lidocaine   4% Patch 1 Patch Transdermal daily PRN moderate pain  nicotine  Polacrilex Gum 2 milliGRAM(s) Oral every 2 hours PRN Smoking Cessation  polyethylene glycol 3350 17 Gram(s) Oral two times a day PRN Constipation  traZODone 50 milliGRAM(s) Oral at bedtime PRN insomnia  
MEDICATIONS  (STANDING):  traZODone 100 milliGRAM(s) Oral at bedtime    MEDICATIONS  (PRN):  aluminum hydroxide/magnesium hydroxide/simethicone Suspension 30 milliLiter(s) Oral every 6 hours PRN Dyspepsia  hydrOXYzine hydrochloride 50 milliGRAM(s) Oral every 6 hours PRN acute anxiety  ibuprofen  Tablet. 400 milliGRAM(s) Oral every 8 hours PRN Moderate Pain (4 - 6)  lidocaine   4% Patch 1 Patch Transdermal daily PRN moderate pain  nicotine  Polacrilex Gum 2 milliGRAM(s) Oral every 2 hours PRN Smoking Cessation  polyethylene glycol 3350 17 Gram(s) Oral two times a day PRN Constipation  traZODone 50 milliGRAM(s) Oral at bedtime PRN insomnia  
MEDICATIONS  (STANDING):  buPROPion XL (24-Hour) 150 milliGRAM(s) Oral daily  traZODone 100 milliGRAM(s) Oral at bedtime    MEDICATIONS  (PRN):  aluminum hydroxide/magnesium hydroxide/simethicone Suspension 30 milliLiter(s) Oral every 6 hours PRN Dyspepsia  hydrOXYzine hydrochloride 50 milliGRAM(s) Oral every 6 hours PRN acute anxiety  LORazepam   Injectable 2 milliGRAM(s) IntraMuscular once PRN Agitation  naproxen 500 milliGRAM(s) Oral every 12 hours PRN pain  nicotine  Polacrilex Gum 2 milliGRAM(s) Oral every 2 hours PRN Smoking Cessation  polyethylene glycol 3350 17 Gram(s) Oral two times a day PRN Constipation  traZODone 50 milliGRAM(s) Oral at bedtime PRN insomnia  
MEDICATIONS  (STANDING):  diphenhydrAMINE 50 milliGRAM(s) Oral once  traZODone 100 milliGRAM(s) Oral at bedtime    MEDICATIONS  (PRN):  aluminum hydroxide/magnesium hydroxide/simethicone Suspension 30 milliLiter(s) Oral every 6 hours PRN Dyspepsia  hydrOXYzine hydrochloride 50 milliGRAM(s) Oral every 6 hours PRN acute anxiety  ibuprofen  Tablet. 400 milliGRAM(s) Oral every 6 hours PRN Mild Pain (1 - 3), Moderate Pain (4 - 6)  lidocaine   4% Patch 1 Patch Transdermal daily PRN moderate pain  nicotine  Polacrilex Gum 2 milliGRAM(s) Oral every 2 hours PRN Smoking Cessation  polyethylene glycol 3350 17 Gram(s) Oral two times a day PRN Constipation  traZODone 50 milliGRAM(s) Oral at bedtime PRN insomnia

## 2024-09-10 NOTE — BH DISCHARGE NOTE NURSING/SOCIAL WORK/PSYCH REHAB - NSDCPRGOAL_PSY_ALL_CORE
Over the course of the current hospitalization, Psychiatric Rehabilitation Staff and patient discussed level of functioning, addressed concerns surrounding the hospitalization, discharge, engaged in skill development and safety planning. Patient met specified goal of developing a relapse prevention plan. Progress was evidenced by patient reporting ‘I will be entering into a three-to-six-month substance rehabilitation program [facility] in [place], avoiding my old neighborhood which tends to lead me to relapsing, and smoking a cigarette whenever I get the craving for alcohol which I know isn’t the best though it’s better’. Patient completed a safety plan upon discharge. A copy of the safety plan was apologized upon discharge for reference.

## 2024-09-10 NOTE — BH INPATIENT PSYCHIATRY DISCHARGE NOTE - HOSPITAL COURSE
Patient is a 49 year old, male; domicile alone; single; noncaregiver; unemployed; PPH of depression; two previous hospitalizations (discharged today from [facility]) ; one past suicide attempt by overdose over 10 years ago; remote history of violence or arrests; h/o ETOH and PCP abuse ( reports having one beer today); no known h/o complicated withdrawal; PMH of arthritis to right knee; brought in by EMS; called by aunt for suicidal ideation.   He was discharged from another inpatient service on the day of admission.  Utox +etoh (98) and PCP.  Differential includes mdd, severe and substance induced mood disorder.  Pt was admitted on a voluntary legal status.    The patient has continued to show improvement in symptoms.  He states his depression is much improved, and he denies any anxiety.  He denies any SI, and his behavior has been well controlled.  There were no acute psychotic symptoms noted at the time of discharge, pt denies AH/VH/TH, no CAH, no paranoia or delusions. The patient has been sleeping better, without nightmares.  The patient has been fully engaged in treatment on the unit, compliant with medications, and is looking forward to continuing care as an outpatient.  The patient was able to safety plan appropriately.  The patient’s risk cannot be further decreased with continued inpatient hospitalization.  He is no longer an acute danger to himself or others, and is stable for discharge.    Patient showed benefit from Trazodone 100mg provided at bedtime and had a brief course on Wellbutrin XL 150mg that was discontinued during the hospital course due to complaints of irritability.    Patient reports mood improved, denies AVH, delusions, SI/HI. Sleep improved and denies depressed mood.  Remains focused on treatment with rehab bed in place, and appears motivated for treatment.  Pt is psychiatrically and medically stable for discharge. Patient is a 49 year old, male; domicile alone; single; noncaregiver; unemployed; PPH of depression; two previous hospitalizations (discharged today from [facility]) ; one past suicide attempt by overdose over 10 years ago; remote history of violence or arrests; h/o ETOH and PCP abuse ( reports having one beer today); no known h/o complicated withdrawal; PMH of arthritis to right knee; brought in by EMS; called by aunt for suicidal ideation.   He was discharged from another inpatient service on the day of admission.  Utox +etoh (98) and PCP.  Differential includes mdd, severe and substance induced mood disorder.  Pt was admitted on a voluntary legal status.    The patient has continued to show improvement in symptoms.  He states his depression is much improved, and he denies any anxiety.  He denies any SI, and his behavior has been well controlled.  There were no acute psychotic symptoms noted at the time of discharge, pt denies AH/VH/TH, no CAH, no paranoia or delusions. The patient has been sleeping better, without nightmares.  The patient has been fully engaged in treatment on the unit, compliant with medications, and is looking forward to continuing care as an outpatient.  The patient was able to safety plan appropriately.  The patient’s risk cannot be further decreased with continued inpatient hospitalization.  He is no longer an acute danger to himself or others, and is stable for discharge.    Patient showed benefit from Trazodone 100mg provided at bedtime and had a brief course on Wellbutrin XL 150mg that was discontinued during the hospital course due to complaints of irritability.    Patient reports mood improved, denies AVH, delusions, SI/HI. Sleep improved and denies depressed mood.  Remains focused on treatment with rehab bed in place, and appears motivated for treatment.  Pt is psychiatrically and medically stable for discharge.    Aftercare Appointment  On-site at [facility]  11-Sep-2024 11:00  [facility] [facility] [address]  [phone]  Substance Use Disorder Treatment

## 2024-09-10 NOTE — BH INPATIENT PSYCHIATRY PROGRESS NOTE - NSTXSUBMISDATETRGT_PSY_ALL_CORE
10-Sep-2024
22-Aug-2024
22-Aug-2024
05-Sep-2024
05-Sep-2024
10-Sep-2024
21-Aug-2024
10-Sep-2024
30-Aug-2024
22-Aug-2024
29-Aug-2024
22-Aug-2024
30-Aug-2024
10-Sep-2024
30-Aug-2024
10-Sep-2024
29-Aug-2024
22-Aug-2024
10-Sep-2024
22-Aug-2024

## 2024-09-10 NOTE — BH INPATIENT PSYCHIATRY PROGRESS NOTE - MSE OPTIONS
Unstructured MSE
Structured MSE
Unstructured MSE

## 2024-09-10 NOTE — BH INPATIENT PSYCHIATRY PROGRESS NOTE - NSBHASSESSSUMMFT_PSY_ALL_CORE
Patient is a 49 year old, male; domicile alone; single; noncaregiver; unemployed; PPH of depression; two previous hospitalizations (discharged today from [facility]) ; one past suicide attempt by overdose over 10 years ago; remote history of violence or arrests; h/o ETOH and PCP abuse ( reports having one beer today); no known h/o complicated withdrawal; PMH of arthritis to right knee; brought in by EMS; called by aunt for suicidal ideation.   He was discharged from another inpatient service on the day of admission.  Utox +etoh (98) and PCP.  Differential includes mdd, severe and substance induced mood disorder.      Patient reports mood improving, denies AVH, delusions, SI/HI. Sleep improved last night.  Remains focused on treatment with rehab bed for 9/11, plan for discharge tomorrow with e-scripts submitted to designated pharmacy.     1. Continue 913 hospitalization for stabilization and safety  2. haldol, ativan and benadryl prns away from alcohol while on other inpatient services, one day of drinking, withdrawal seizures unlikely  3. routine checks--reports feeling safe on unit, will notify staff if suicidality returns, no CO indicated.  4. d/c wellbutrin  mg d/t reports increased irritability, continue with Trazodone 100mg QHS for insomnia and 50mg prn  5.  arthritic knee pain-- ordered ibuprofen prn, miralax prn constipation, maalox prn  6. smoking cessation--declines patch will write for gum  7. group/ milieu therapy  8. Dispo: awaiting inpatient rehab, has a [facility] bed with dc plan for 9/11.

## 2024-09-10 NOTE — BH INPATIENT PSYCHIATRY PROGRESS NOTE - NSTXDCOTHRGOAL_PSY_ALL_CORE
SW will encourage pt to accept referral to residential substance abuse treatment.
SW will encourage pt to transfer to long term residential s/a program.
Discharge plan for transfer to long term residential substance abuse program.
SW will encourage pt to accept referral to residential substance abuse treatment.
SW will encourage pt to accept referral to residential substance abuse treatment.
Pt will accept referral to a residential drug treatment program.
Discharge plan for transfer to long term residential substance abuse program.
SW will encourage pt to accept referral to residential substance abuse treatment.
SW will encourage pt to transfer to long term residential s/a program.
Discharge plan for transfer to long term residential substance abuse program.
Discharge plan for transfer to long term residential substance abuse program.
Pt will accept referral to a residential drug treatment program.
Pt will accept referral to a residential drug treatment program.
SW will encourage pt to accept referral to residential substance abuse treatment.
Pt will accept referral to a residential drug treatment program.
SW will encourage pt to accept referral to residential substance abuse treatment.
Discharge plan for transfer to long term residential substance abuse program.
Pt will accept referral to a residential drug treatment program.
SW will encourage pt to transfer to long term residential s/a program.
Pt will accept referral to a residential drug treatment program.

## 2024-09-10 NOTE — BH INPATIENT PSYCHIATRY PROGRESS NOTE - NSTXCOPEDATEEST_PSY_ALL_CORE
23-Aug-2024
23-Aug-2024
14-Aug-2024
14-Aug-2024
23-Aug-2024
14-Aug-2024
14-Aug-2024
23-Aug-2024
23-Aug-2024
14-Aug-2024
14-Aug-2024
23-Aug-2024
14-Aug-2024
23-Aug-2024
14-Aug-2024
14-Aug-2024
23-Aug-2024

## 2024-09-10 NOTE — BH DISCHARGE NOTE NURSING/SOCIAL WORK/PSYCH REHAB - NSDCPRRECOMMEND_PSY_ALL_CORE
Upon discharge, it is recommended that patient utilize outpatient treatment to sustain noted improvement and to continue utilizing their healthy coping skills and to expand their inventory of healthy coping skills.

## 2024-09-10 NOTE — BH INPATIENT PSYCHIATRY PROGRESS NOTE - NSTXSUBMISPROGRES_PSY_ALL_CORE
No Change
Improving
No Change
Met - goal discontinued
No Change
No Change
Improving
No Change

## 2024-09-10 NOTE — BH INPATIENT PSYCHIATRY PROGRESS NOTE - NSBHATTESTBILLING_PSY_A_CORE
31709-Dmdkpzjxah OBS or IP - moderate complexity OR 35-49 mins
07983-Mkprdgqnas OBS or IP - moderate complexity OR 35-49 mins
27541-Grwuihegsf OBS or IP - moderate complexity OR 35-49 mins
67762-Nypzwovnmn OBS or IP - moderate complexity OR 35-49 mins
51951-Oelyxfbzsh OBS or IP - moderate complexity OR 35-49 mins
71247-Iejyvfshfr OBS or IP - moderate complexity OR 35-49 mins
54329-Kohlfxhpuw OBS or IP - moderate complexity OR 35-49 mins
35019-Jjhmejimff OBS or IP - moderate complexity OR 35-49 mins
61076-Tsohqorhey OBS or IP - moderate complexity OR 35-49 mins
44626-Ogvzcbrdzb OBS or IP - moderate complexity OR 35-49 mins
17583-Luhucqnkjm OBS or IP - moderate complexity OR 35-49 mins
22110-Xgybrfvhoi OBS or IP - moderate complexity OR 35-49 mins
56625-Zqmzjakdnz OBS or IP - low complexity OR 25-34 mins
73217-Itsrrvsbwg OBS or IP - low complexity OR 25-34 mins
48915-Lzwdvsiocc OBS or IP - moderate complexity OR 35-49 mins
11851-Ijtwstnhwn OBS or IP - moderate complexity OR 35-49 mins
61091-Danhwbijad OBS or IP - moderate complexity OR 35-49 mins
28804-Vzyjtksufw OBS or IP - low complexity OR 25-34 mins
33712-Utrlpnytwo OBS or IP - moderate complexity OR 35-49 mins
04017-Uchxdsisvx OBS or IP - moderate complexity OR 35-49 mins

## 2024-09-10 NOTE — BH INPATIENT PSYCHIATRY PROGRESS NOTE - NSBHFUPINTERVALCCFT_PSY_A_CORE
Seen for follow up of depression, SI.
reported feeling "fine"
Seen for follow up of depression, SI.
reported feeling "fine"
Seen for follow up of depression, SI.
reported feeling "fine"
reported feeling "fine"
Seen for follow up of depression, SI.
reported feeling "fine"
Seen for follow up of depression, SI.
reported feeling "fine"
Patient seen, chart reviewed, case discussed with team.  patient seen for follow up of depression and si.

## 2024-09-10 NOTE — BH INPATIENT PSYCHIATRY PROGRESS NOTE - NSTXDCOTHRDATEEST_PSY_ALL_CORE
21-Aug-2024
15-Aug-2024
04-Sep-2024
28-Aug-2024
04-Sep-2024
15-Aug-2024
15-Aug-2024
21-Aug-2024
04-Sep-2024
04-Sep-2024
21-Aug-2024
21-Aug-2024
04-Sep-2024
21-Aug-2024
28-Aug-2024
15-Aug-2024
15-Aug-2024
21-Aug-2024
28-Aug-2024
15-Aug-2024

## 2024-09-10 NOTE — BH INPATIENT PSYCHIATRY PROGRESS NOTE - NSDCCRITERIA_PSY_ALL_CORE
cgi-i<=2

## 2024-09-10 NOTE — BH INPATIENT PSYCHIATRY PROGRESS NOTE - PRN MEDS
MEDICATIONS  (PRN):  aluminum hydroxide/magnesium hydroxide/simethicone Suspension 30 milliLiter(s) Oral every 6 hours PRN Dyspepsia  hydrOXYzine hydrochloride 50 milliGRAM(s) Oral every 6 hours PRN acute anxiety  ibuprofen  Tablet. 400 milliGRAM(s) Oral every 6 hours PRN Mild Pain (1 - 3), Moderate Pain (4 - 6)  lidocaine   4% Patch 1 Patch Transdermal daily PRN moderate pain  nicotine  Polacrilex Gum 2 milliGRAM(s) Oral every 2 hours PRN Smoking Cessation  polyethylene glycol 3350 17 Gram(s) Oral two times a day PRN Constipation  traZODone 50 milliGRAM(s) Oral at bedtime PRN insomnia  
MEDICATIONS  (PRN):  aluminum hydroxide/magnesium hydroxide/simethicone Suspension 30 milliLiter(s) Oral every 6 hours PRN Dyspepsia  hydrOXYzine hydrochloride 50 milliGRAM(s) Oral every 6 hours PRN acute anxiety  ibuprofen  Tablet. 400 milliGRAM(s) Oral every 6 hours PRN Mild Pain (1 - 3), Moderate Pain (4 - 6)  lidocaine   4% Patch 1 Patch Transdermal daily PRN moderate pain  nicotine  Polacrilex Gum 2 milliGRAM(s) Oral every 2 hours PRN Smoking Cessation  polyethylene glycol 3350 17 Gram(s) Oral two times a day PRN Constipation  traZODone 50 milliGRAM(s) Oral at bedtime PRN insomnia  
MEDICATIONS  (PRN):  aluminum hydroxide/magnesium hydroxide/simethicone Suspension 30 milliLiter(s) Oral every 6 hours PRN Dyspepsia  hydrOXYzine hydrochloride 50 milliGRAM(s) Oral every 6 hours PRN acute anxiety  ibuprofen  Tablet. 400 milliGRAM(s) Oral every 8 hours PRN Moderate Pain (4 - 6)  lidocaine   4% Patch 1 Patch Transdermal daily PRN moderate pain  LORazepam   Injectable 2 milliGRAM(s) IntraMuscular once PRN Agitation  nicotine  Polacrilex Gum 2 milliGRAM(s) Oral every 2 hours PRN Smoking Cessation  polyethylene glycol 3350 17 Gram(s) Oral two times a day PRN Constipation  traZODone 50 milliGRAM(s) Oral at bedtime PRN insomnia  
MEDICATIONS  (PRN):  aluminum hydroxide/magnesium hydroxide/simethicone Suspension 30 milliLiter(s) Oral every 6 hours PRN Dyspepsia  hydrOXYzine hydrochloride 50 milliGRAM(s) Oral every 6 hours PRN acute anxiety  LORazepam   Injectable 2 milliGRAM(s) IntraMuscular once PRN Agitation  naproxen 500 milliGRAM(s) Oral every 12 hours PRN pain  nicotine  Polacrilex Gum 2 milliGRAM(s) Oral every 2 hours PRN Smoking Cessation  polyethylene glycol 3350 17 Gram(s) Oral two times a day PRN Constipation  traZODone 50 milliGRAM(s) Oral at bedtime PRN insomnia  
MEDICATIONS  (PRN):  aluminum hydroxide/magnesium hydroxide/simethicone Suspension 30 milliLiter(s) Oral every 6 hours PRN Dyspepsia  hydrOXYzine hydrochloride 50 milliGRAM(s) Oral every 6 hours PRN acute anxiety  ibuprofen  Tablet. 400 milliGRAM(s) Oral every 6 hours PRN Mild Pain (1 - 3), Moderate Pain (4 - 6)  lidocaine   4% Patch 1 Patch Transdermal daily PRN moderate pain  nicotine  Polacrilex Gum 2 milliGRAM(s) Oral every 2 hours PRN Smoking Cessation  polyethylene glycol 3350 17 Gram(s) Oral two times a day PRN Constipation  traZODone 50 milliGRAM(s) Oral at bedtime PRN insomnia  
MEDICATIONS  (PRN):  hydrOXYzine hydrochloride 50 milliGRAM(s) Oral every 6 hours PRN acute anxiety  LORazepam   Injectable 2 milliGRAM(s) IntraMuscular Once PRN severe agitation  naproxen 500 milliGRAM(s) Oral every 12 hours PRN pain  nicotine  Polacrilex Gum 2 milliGRAM(s) Oral every 2 hours PRN Smoking Cessation  
MEDICATIONS  (PRN):  aluminum hydroxide/magnesium hydroxide/simethicone Suspension 30 milliLiter(s) Oral every 6 hours PRN Dyspepsia  hydrOXYzine hydrochloride 50 milliGRAM(s) Oral every 6 hours PRN acute anxiety  ibuprofen  Tablet. 400 milliGRAM(s) Oral every 8 hours PRN Moderate Pain (4 - 6)  lidocaine   4% Patch 1 Patch Transdermal daily PRN moderate pain  nicotine  Polacrilex Gum 2 milliGRAM(s) Oral every 2 hours PRN Smoking Cessation  polyethylene glycol 3350 17 Gram(s) Oral two times a day PRN Constipation  traZODone 50 milliGRAM(s) Oral at bedtime PRN insomnia  
MEDICATIONS  (PRN):  aluminum hydroxide/magnesium hydroxide/simethicone Suspension 30 milliLiter(s) Oral every 6 hours PRN Dyspepsia  hydrOXYzine hydrochloride 50 milliGRAM(s) Oral every 6 hours PRN acute anxiety  LORazepam   Injectable 2 milliGRAM(s) IntraMuscular once PRN Agitation  naproxen 500 milliGRAM(s) Oral every 12 hours PRN pain  nicotine  Polacrilex Gum 2 milliGRAM(s) Oral every 2 hours PRN Smoking Cessation  polyethylene glycol 3350 17 Gram(s) Oral two times a day PRN Constipation  traZODone 50 milliGRAM(s) Oral at bedtime PRN insomnia  
MEDICATIONS  (PRN):  aluminum hydroxide/magnesium hydroxide/simethicone Suspension 30 milliLiter(s) Oral every 6 hours PRN Dyspepsia  hydrOXYzine hydrochloride 50 milliGRAM(s) Oral every 6 hours PRN acute anxiety  ibuprofen  Tablet. 400 milliGRAM(s) Oral every 8 hours PRN Moderate Pain (4 - 6)  lidocaine   4% Patch 1 Patch Transdermal daily PRN moderate pain  nicotine  Polacrilex Gum 2 milliGRAM(s) Oral every 2 hours PRN Smoking Cessation  polyethylene glycol 3350 17 Gram(s) Oral two times a day PRN Constipation  traZODone 50 milliGRAM(s) Oral at bedtime PRN insomnia  
MEDICATIONS  (PRN):  aluminum hydroxide/magnesium hydroxide/simethicone Suspension 30 milliLiter(s) Oral every 6 hours PRN Dyspepsia  hydrOXYzine hydrochloride 50 milliGRAM(s) Oral every 6 hours PRN acute anxiety  LORazepam   Injectable 2 milliGRAM(s) IntraMuscular once PRN Agitation  naproxen 500 milliGRAM(s) Oral every 12 hours PRN pain  nicotine  Polacrilex Gum 2 milliGRAM(s) Oral every 2 hours PRN Smoking Cessation  polyethylene glycol 3350 17 Gram(s) Oral two times a day PRN Constipation  traZODone 50 milliGRAM(s) Oral at bedtime PRN insomnia  
MEDICATIONS  (PRN):  aluminum hydroxide/magnesium hydroxide/simethicone Suspension 30 milliLiter(s) Oral every 6 hours PRN Dyspepsia  hydrOXYzine hydrochloride 50 milliGRAM(s) Oral every 6 hours PRN acute anxiety  ibuprofen  Tablet. 400 milliGRAM(s) Oral every 6 hours PRN Mild Pain (1 - 3), Moderate Pain (4 - 6)  lidocaine   4% Patch 1 Patch Transdermal daily PRN moderate pain  nicotine  Polacrilex Gum 2 milliGRAM(s) Oral every 2 hours PRN Smoking Cessation  polyethylene glycol 3350 17 Gram(s) Oral two times a day PRN Constipation  traZODone 50 milliGRAM(s) Oral at bedtime PRN insomnia  
MEDICATIONS  (PRN):  aluminum hydroxide/magnesium hydroxide/simethicone Suspension 30 milliLiter(s) Oral every 6 hours PRN Dyspepsia  hydrOXYzine hydrochloride 50 milliGRAM(s) Oral every 6 hours PRN acute anxiety  LORazepam   Injectable 2 milliGRAM(s) IntraMuscular once PRN Agitation  naproxen 500 milliGRAM(s) Oral every 12 hours PRN pain  nicotine  Polacrilex Gum 2 milliGRAM(s) Oral every 2 hours PRN Smoking Cessation  polyethylene glycol 3350 17 Gram(s) Oral two times a day PRN Constipation  traZODone 50 milliGRAM(s) Oral at bedtime PRN insomnia  
MEDICATIONS  (PRN):  aluminum hydroxide/magnesium hydroxide/simethicone Suspension 30 milliLiter(s) Oral every 6 hours PRN Dyspepsia  hydrOXYzine hydrochloride 50 milliGRAM(s) Oral every 6 hours PRN acute anxiety  ibuprofen  Tablet. 400 milliGRAM(s) Oral every 8 hours PRN Moderate Pain (4 - 6)  lidocaine   4% Patch 1 Patch Transdermal daily PRN moderate pain  nicotine  Polacrilex Gum 2 milliGRAM(s) Oral every 2 hours PRN Smoking Cessation  polyethylene glycol 3350 17 Gram(s) Oral two times a day PRN Constipation  traZODone 50 milliGRAM(s) Oral at bedtime PRN insomnia  
MEDICATIONS  (PRN):  hydrOXYzine hydrochloride 50 milliGRAM(s) Oral every 6 hours PRN acute anxiety  LORazepam   Injectable 2 milliGRAM(s) IntraMuscular Once PRN severe agitation  naproxen 500 milliGRAM(s) Oral every 12 hours PRN pain  nicotine  Polacrilex Gum 2 milliGRAM(s) Oral every 2 hours PRN Smoking Cessation  traZODone 50 milliGRAM(s) Oral at bedtime PRN insomnia  
MEDICATIONS  (PRN):  aluminum hydroxide/magnesium hydroxide/simethicone Suspension 30 milliLiter(s) Oral every 6 hours PRN Dyspepsia  hydrOXYzine hydrochloride 50 milliGRAM(s) Oral every 6 hours PRN acute anxiety  LORazepam   Injectable 2 milliGRAM(s) IntraMuscular once PRN Agitation  naproxen 500 milliGRAM(s) Oral every 12 hours PRN pain  nicotine  Polacrilex Gum 2 milliGRAM(s) Oral every 2 hours PRN Smoking Cessation  polyethylene glycol 3350 17 Gram(s) Oral two times a day PRN Constipation  traZODone 50 milliGRAM(s) Oral at bedtime PRN insomnia  
MEDICATIONS  (PRN):  hydrOXYzine hydrochloride 50 milliGRAM(s) Oral every 6 hours PRN acute anxiety  LORazepam   Injectable 2 milliGRAM(s) IntraMuscular Once PRN severe agitation  naproxen 500 milliGRAM(s) Oral every 12 hours PRN pain  nicotine  Polacrilex Gum 2 milliGRAM(s) Oral every 2 hours PRN Smoking Cessation  
MEDICATIONS  (PRN):  aluminum hydroxide/magnesium hydroxide/simethicone Suspension 30 milliLiter(s) Oral every 6 hours PRN Dyspepsia  hydrOXYzine hydrochloride 50 milliGRAM(s) Oral every 6 hours PRN acute anxiety  ibuprofen  Tablet. 400 milliGRAM(s) Oral every 8 hours PRN Moderate Pain (4 - 6)  lidocaine   4% Patch 1 Patch Transdermal daily PRN moderate pain  nicotine  Polacrilex Gum 2 milliGRAM(s) Oral every 2 hours PRN Smoking Cessation  polyethylene glycol 3350 17 Gram(s) Oral two times a day PRN Constipation  traZODone 50 milliGRAM(s) Oral at bedtime PRN insomnia  
MEDICATIONS  (PRN):  hydrOXYzine hydrochloride 50 milliGRAM(s) Oral every 6 hours PRN acute anxiety  LORazepam   Injectable 2 milliGRAM(s) IntraMuscular Once PRN severe agitation  naproxen 500 milliGRAM(s) Oral every 12 hours PRN pain  nicotine  Polacrilex Gum 2 milliGRAM(s) Oral every 2 hours PRN Smoking Cessation

## 2024-09-10 NOTE — BH DISCHARGE NOTE NURSING/SOCIAL WORK/PSYCH REHAB - NSDCPEEMAIL_GEN_ALL_CORE
[facility] for Tobacco Control email [email or web address]

## 2024-09-10 NOTE — BH INPATIENT PSYCHIATRY PROGRESS NOTE - NSTXCOPEPROGRES_PSY_ALL_CORE
Improving
Improving
No Change
Improving
No Change
Improving
No Change
Improving
No Change

## 2024-09-10 NOTE — BH INPATIENT PSYCHIATRY PROGRESS NOTE - MSE UNSTRUCTURED FT
Appearance: Dressed appropriately.  Behavior: Cooperative.   Motor: No abnormal movements, no psychomotor slowing or activation.  Speech: Regular rate.  Mood: "fine"   Affect: Neutral  Thought Process: Linear.  Associations: Fair.  Thought Content: Denies AVH.  Denies SIIP or HIIP.  Insight: Limited.  Judgment: Fair on interview.  Attention: Fair.  Language: Fluent.  Gait: Intact. 
Appearance: Dressed appropriately.  Behavior: Cooperative.   Motor: No abnormal movements, no psychomotor slowing or activation.  Speech: Regular rate.  Mood: "fine"   Affect: euthymic  Thought Process: Linear.  Associations: Fair.  Thought Content: Denies AVH.  Denies SIIP or HIIP.  Insight: Limited.  Judgment: Fair on interview.  Attention: Fair.  Language: Fluent.  Gait: Intact. 
Appearance: Dressed appropriately.  Behavior: Cooperative.   Motor: No abnormal movements, no psychomotor slowing or activation.  Speech: Regular rate.  Mood: "fine"   Affect: euthymic  Thought Process: Linear.  Associations: Fair.  Thought Content: Denies AVH.  Denies SIIP or HIIP.  Insight: Limited.  Judgment: Fair on interview.  Attention: Fair.  Language: Fluent.  Gait: Intact. 
Appearance: Dressed appropriately.  Behavior: Cooperative.   Motor: No abnormal movements, no psychomotor slowing or activation.  Speech: Regular rate.  Mood: "Alright."  Affect: Neutral.  Thought Process: Linear.  Associations: Fair.  Thought Content: Denies AVH.  Denies SIIP or HIIP.  Insight: Limited.  Judgment: Fair on interview.  Attention: Fair.  Language: Fluent.  Gait: Intact. 
Appearance: Dressed appropriately.  Behavior: Cooperative.   Motor: No abnormal movements, no psychomotor slowing or activation.  Speech: Regular rate.  Mood: "fine"   Affect: euthymic  Thought Process: Linear.  Associations: Fair.  Thought Content: Denies AVH.  Denies SIIP or HIIP.  Insight: Limited.  Judgment: Fair on interview.  Attention: Fair.  Language: Fluent.  Gait: Intact. 
Appearance: Dressed appropriately.  Behavior: Cooperative.  Found playing basketball.  Motor: No abnormal movements, no psychomotor slowing or activation.  Speech: Regular rate.  Mood: "Ok."  Affect: Neutral.  Thought Process: Linear.  Associations: Fair.  Thought Content: Denies AVH.  Denies SIIP or HIIP.  Insight: Limited.  Judgment: Fair on interview.  Attention: Fair.  Language: Fluent.  Gait: Intact. 
Appearance: Dressed appropriately.  Behavior: Cooperative.   Motor: No abnormal movements, no psychomotor slowing or activation.  Speech: Regular rate.  Mood: "fine"   Affect: Neutral  Thought Process: Linear.  Associations: Fair.  Thought Content: Denies AVH.  Denies SIIP or HIIP.  Insight: Limited.  Judgment: Fair on interview.  Attention: Fair.  Language: Fluent.  Gait: Intact. 
Appearance: Dressed appropriately.  Behavior: Cooperative.   Motor: No abnormal movements, no psychomotor slowing or activation.  Speech: Regular rate.  Mood: "good"  Affect: Neutral.  Thought Process: Linear.  Associations: Fair.  Thought Content: Denies AVH.  Denies SIIP or HIIP.  Insight: Limited.  Judgment: Fair on interview.  Attention: Fair.  Language: Fluent.  Gait: Intact. 
Appearance: Dressed appropriately.  Behavior: Cooperative.   Motor: No abnormal movements, no psychomotor slowing or activation.  Speech: Regular rate.  Mood: "fine"   Affect: euthymic  Thought Process: Linear.  Associations: Fair.  Thought Content: Denies AVH.  Denies SIIP or HIIP.  Insight: Limited.  Judgment: Fair on interview.  Attention: Fair.  Language: Fluent.  Gait: Intact. 
Appearance: Dressed appropriately.  Behavior: Cooperative.   Motor: No abnormal movements, no psychomotor slowing or activation.  Speech: Regular rate.  Mood: "good"  Affect: Neutral.  Thought Process: Linear.  Associations: Fair.  Thought Content: Denies AVH.  Denies SIIP or HIIP.  Insight: Limited.  Judgment: Fair on interview.  Attention: Fair.  Language: Fluent.  Gait: Intact. 
Appearance: Dressed appropriately.  Behavior: Cooperative.   Motor: No abnormal movements, no psychomotor slowing or activation.  Speech: Regular rate.  Mood: "fine"   Affect: Neutral  Thought Process: Linear.  Associations: Fair.  Thought Content: Denies AVH.  Denies SIIP or HIIP.  Insight: Limited.  Judgment: Fair on interview.  Attention: Fair.  Language: Fluent.  Gait: Intact. 
Appearance: Dressed appropriately.  Behavior: Cooperative.   Motor: No abnormal movements, no psychomotor slowing or activation.  Speech: Regular rate.  Mood: "good"  Affect: Neutral.  Thought Process: Linear.  Associations: Fair.  Thought Content: Denies AVH.  Denies SIIP or HIIP.  Insight: Limited.  Judgment: Fair on interview.  Attention: Fair.  Language: Fluent.  Gait: Intact. 
Appearance: Dressed appropriately.  Behavior: Cooperative.   Motor: No abnormal movements, no psychomotor slowing or activation.  Speech: Regular rate.  Mood: "good"  Affect: Neutral.  Thought Process: Linear.  Associations: Fair.  Thought Content: Denies AVH.  Denies SIIP or HIIP.  Insight: Limited.  Judgment: Fair on interview.  Attention: Fair.  Language: Fluent.  Gait: Intact. 
Appearance: Dressed appropriately.  Behavior: Cooperative.   Motor: No abnormal movements, no psychomotor slowing or activation.  Speech: Regular rate.  Mood: "fine"   Affect: euthymic  Thought Process: Linear.  Associations: Fair.  Thought Content: Denies AVH.  Denies SIIP or HIIP.  Insight: Limited.  Judgment: Fair on interview.  Attention: Fair.  Language: Fluent.  Gait: Intact. 
Appearance: Dressed appropriately.  Behavior: Cooperative.   Motor: No abnormal movements, no psychomotor slowing or activation.  Speech: Regular rate.  Mood: "Alright."  Affect: Neutral.  Thought Process: Linear.  Associations: Fair.  Thought Content: Denies AVH.  Denies SIIP or HIIP.  Insight: Limited.  Judgment: Fair on interview.  Attention: Fair.  Language: Fluent.  Gait: Intact. 
Appearance: Dressed appropriately.  Behavior: Cooperative.   Motor: No abnormal movements, no psychomotor slowing or activation.  Speech: Regular rate.  Mood: "good"  Affect: Neutral.  Thought Process: Linear.  Associations: Fair.  Thought Content: Denies AVH.  Denies SIIP or HIIP.  Insight: Limited.  Judgment: Fair on interview.  Attention: Fair.  Language: Fluent.  Gait: Intact. 
Appearance: Dressed appropriately.  Behavior: Cooperative.   Motor: No abnormal movements, no psychomotor slowing or activation.  Speech: Regular rate.  Mood: "fine"   Affect: euthymic  Thought Process: Linear.  Associations: Fair.  Thought Content: Denies AVH.  Denies SIIP or HIIP.  Insight: Limited.  Judgment: Fair on interview.  Attention: Fair.  Language: Fluent.  Gait: Intact. 
Appearance: Dressed appropriately.  Behavior: Cooperative.   Motor: No abnormal movements, no psychomotor slowing or activation.  Speech: Regular rate.  Mood: "good"  Affect: Neutral.  Thought Process: Linear.  Associations: Fair.  Thought Content: Denies AVH.  Denies SIIP or HIIP.  Insight: Limited.  Judgment: Fair on interview.  Attention: Fair.  Language: Fluent.  Gait: Intact. 
Appearance: Dressed appropriately.  Behavior: Cooperative.  Found playing basketball.  Motor: No abnormal movements, no psychomotor slowing or activation.  Speech: Regular rate.  Mood: "Ok."  Affect: Neutral.  Thought Process: Linear.  Associations: Fair.  Thought Content: Denies AVH.  Denies SIIP or HIIP.  Insight: Limited.  Judgment: Fair on interview.  Attention: Fair.  Language: Fluent.  Gait: Intact.

## 2024-09-10 NOTE — BH INPATIENT PSYCHIATRY DISCHARGE NOTE - NSBHFUPINTERVALHXFT_PSY_A_CORE
Pt is seen and evaluated for f/up MDD, Alocohol Dependence, care discussed w/ tx team, VSS.  No issues overnight.  Patient remains motivated for rehab and optimistic about plans for discharge today to [facility].  Pt denies SI, no intent or plan, no HI or violent thoughts, denies AH/VH.  Pt has been med adherant and denies side effects from meds. Pt denies any acute cravings for substances.  Pt reported sleep good and with good appetite.  No agitation and in good behavioral control.  Visible on milieu.  No somatic complaints. D/w SW and pt has been accepted at [facility] for today 9/11 with discharge plan in place, e-scripts sent to pharmacy yesterday.    The patient has continued to show improvement in symptoms.  He states his depression is much improved, and he denies any anxiety.  He denies any SI, and his behavior has been well controlled.  There were no acute psychotic symptoms noted at the time of discharge, pt denies AH/VH/TH, no CAH, no paranoia or delusions. The patient has been sleeping better, without nightmares.  The patient has been fully engaged in treatment on the unit, compliant with medications, and is looking forward to continuing care as an outpatient.  The patient was able to safety plan appropriately.  The patient’s risk cannot be further decreased with continued inpatient hospitalization.  He is no longer an acute danger to himself or others, and is stable for discharge.

## 2024-09-10 NOTE — BH INPATIENT PSYCHIATRY PROGRESS NOTE - NSTXSUBMISDATEEST_PSY_ALL_CORE
15-Aug-2024
03-Sep-2024
15-Aug-2024
28-Aug-2024
03-Sep-2024
15-Aug-2024
03-Sep-2024
03-Sep-2024
15-Aug-2024
23-Aug-2024
23-Aug-2024
14-Aug-2024
28-Aug-2024
15-Aug-2024
15-Aug-2024
23-Aug-2024
03-Sep-2024
03-Sep-2024

## 2024-09-10 NOTE — BH INPATIENT PSYCHIATRY PROGRESS NOTE - NSTXDCOTHRDATETRGT_PSY_ALL_CORE
11-Sep-2024
28-Aug-2024
22-Aug-2024
04-Sep-2024
28-Aug-2024
28-Aug-2024
22-Aug-2024
28-Aug-2024
11-Sep-2024
22-Aug-2024
04-Sep-2024
22-Aug-2024
04-Sep-2024
11-Sep-2024
11-Sep-2024
22-Aug-2024
28-Aug-2024
22-Aug-2024
11-Sep-2024
04-Sep-2024

## 2024-09-10 NOTE — BH DISCHARGE NOTE NURSING/SOCIAL WORK/PSYCH REHAB - NSDCPEWEB_GEN_ALL_CORE
[facility] for Tobacco Control website --- [email or web address] --- [email or web address]

## 2024-09-10 NOTE — BH DISCHARGE NOTE NURSING/SOCIAL WORK/PSYCH REHAB - NSBHDCADDR1FT_A_CORE
[address] [facility] [address] [address] [facility] [id number]

## 2024-09-10 NOTE — BH INPATIENT PSYCHIATRY PROGRESS NOTE - NSTXDCOTHRPROGRES_PSY_ALL_CORE
Improving
No Change
No Change
Improving
No Change
Improving
No Change
No Change
Improving
No Change

## 2024-09-10 NOTE — BH INPATIENT PSYCHIATRY PROGRESS NOTE - NSBHFUPINTERVALHXFT_PSY_A_CORE
Pt is seen and evaluated for f/up MDD, care discussed w/ tx team, VSS.  No issues overnight.  Patient reported that he prefers to go to [facility] and remains motivated for rehab.  Pt denies SI, no intent or plan, no HI or violent thoughts, denies AH/VH.  Pt has been med adherant and denies side effects from meds. Pt denies any acute cravings for substances.  Pt reported sleep a bit disrupted last night despite Trazodone and with good appetite.  No agitation and in good behavioral control.  Visible on milieu.  No somatic complaints.  
Pt is seen and evaluated for f/up MDD, care discussed w/ tx team, VSS.  No issues overnight.  Patient remains motivated for rehab.  Pt denies SI, no intent or plan, no HI or violent thoughts, denies AH/VH.  Pt has been med adherant and denies side effects from meds. Pt denies any acute cravings for substances.  Pt reported sleep disrupted last night despite, noted unit activity, and with good appetite.  No agitation and in good behavioral control.  Visible on milieu.  No somatic complaints.  
Pt seen and examined. Chart reviewed. No acute overnight events reported. Pt remains medication adherent, tolerating it well without reported side effects. Denies AVH/SI/HI. Sleeping better with increased dose of trazodone with good appetite. In good behavioral control. Pt is future oriented, motivated for exploring inpatient rehab options for etoh use. Denies acute compliant, attending groups with good participation.   
Pt is seen and evaluated for f/up MDD, care discussed w/ tx team, VSS.  No issues overnight.  Patient remains motivated for rehab.  Pt denies SI, no intent or plan, no HI or violent thoughts, denies AH/VH.  Pt has been med adherant and denies side effects from meds. Pt denies any acute cravings for substances.  Pt reported sleep disrupted last night despite, noted unit activity, and with good appetite.  No agitation and in good behavioral control.  Visible on milieu.  No somatic complaints. D/w SW and pt has been accepted at [facility] for Wednesday 9/11.
Pt seen and examined. Chart reviewed. No acute overnight events reported. Pt remains medication adherent, tolerating it well without reported side effects. Denies AVH/SI/HI. Sleeping better with increased dose of trazodone with good appetite. In good behavioral control. Pt is future oriented, more motivated for exploring inpatient rehab options for etoh use. Tolerating initiation of Wellbutrin well thus far.   
Pt is seen and evaluated for f/up MDD, care discussed w/ tx team, VSS.  No issues overnight.  Patient reported that he really prefers to go to [facility] over [facility] and remains motivated for rehab.  Pt denies SI, no intent or plan, no HI or violent thoughts, denies AH/VH.  Pt has been med adherant and denies side effects from meds. Pt denies any acute cravings for substances.  Pt reported sleeping well and with good appetite.  No agitation and in good behavioral control.  Visible on milieu.  No somatic complaints.  
Pt discussed with nursing and treatment team.  Chart is reviewed.  Pt is seen in dayroom. He reports depressed mood related to social stressors but states he is feeling better since admission. He states he couldn't sleep last night, reports no benefit from Trazodone 50mg, denies this is recurrent. He reports normal appetite, denies AVH, delusions, SI/HI. He reports constipation yesterday and received Miralax. Pt with some ambivalence regarding need for substance focused treatment, now willing to consider meds, concerns expressed for past sexual side effects.  He denies additional concerns. 
Pt is seen and evaluated for f/up MDD, care discussed w/ tx team, VSS.  No issues overnight.  Patient remains motivated for rehab and optimistic about plans for discharge tomorrow.  Pt denies SI, no intent or plan, no HI or violent thoughts, denies AH/VH.  Pt has been med adherant and denies side effects from meds. Pt denies any acute cravings for substances.  Pt reported sleep good last night and with good appetite.  No agitation and in good behavioral control.  Visible on milieu.  No somatic complaints. D/w SW and pt has been accepted at [facility] for Wednesday 9/11 with discharge planning being planned for tomorrow.
Pt today states he is doing ok, reports adequate sleep and appetite, denies any voices or thoughts of harming self/others.  Observed playing ping pong with staff. Calm and pleasant on approach. Adamantly declining starting any medications though he admits he came in with SI. Denies any current SI, HI, A/VH, or paranoia and agrees to come to staff immediately with any safety concerns 
Pt seen and examined. Chart reviewed. No acute overnight events reported.  Denies AVH/SI/HI. Sleeping better with good appetite. Pt is future oriented, motivated for inpatient rehab for etoh use. Pt remains in good control,  socializing with select peers. Overall mood is brighter and less irritable since stopping Wellbutrin/ denies acute depressed mood. Attending groups with good participation. Denies acute complaints. Reports having a good interview with a rehab and feels like it will be a good fit. 
Pt seen and examined. Chart reviewed. No acute overnight events reported. Pt remains medication adherent, tolerating it well without reported side effects. Denies AVH/SI/HI. Sleeping better with good appetite. Pt is future oriented, motivated for inpatient rehab options for etoh use. Pt attending groups with good participation.  In good behavioral control, socializing with select peers. Denies acute complaints.   
Pt today states he is doing ok, reports adequate sleep and appetite, denies any voices or thoughts of harming self/others.  Asks if he is allowed to shave his head.
Pt seen and examined. Chart reviewed. No acute overnight events reported.  Denies AVH/SI/HI. Sleeping better with good appetite. Pt is future oriented, motivated for inpatient rehab for etoh use. Pt remains in good control,  socializing with select peers. Pt reports being more irritable since starting wellbutrin, reports wish to no longer continue this, denies acute depressed mood. Attending groups with good participation. Denies acute complaints.
f/up MDD, care discussed w/ tx team, [facility]. Patient reported that the interview this AM with [facility] went fine, however he really prefers to go to [facility]. no SE from meds. no cravings for substances. reported sleeping well and with good appetite. 
Pt seen and examined. Chart reviewed. No acute overnight events reported. Pt remains medication adherent, tolerating it well without reported side effects. Denies AVH/SI/HI. Sleeping better with good appetite. Pt is future oriented, motivated for inpatient rehab for etoh use. Pt remains in good control,  socializing with select peers. Denies acute complaints.  Pt with good group participation, wants to start rehab process so groups are more geared towards addiction.
Pt seen and examined. Chart reviewed. No acute overnight events reported. Pt remains medication adherent, tolerating it well without reported side effects. Denies AVH/SI/HI. Sleeping better with good appetite. Pt is future oriented, motivated for inpatient rehab for etoh use. Pt remains in good control,  socializing with select peers. Reports mood is stable, but more irritable edge today secondary with frustration waiting to start rehab process. Attending groups with good participation.   Denies acute complaints.
Pt seen and examined. Chart reviewed. No acute overnight events reported. Pt remains medication adherent, tolerating it well without reported side effects. Denies AVH/SI/HI. Sleeping better with increased dose of trazodone with good appetite. Pt is future oriented, motivated for exploring inpatient rehab options for etoh use. Denies acute compliant, attending groups with good participation.  In good behavioral control. Pt had screening with a rehab this afternoon  
Pt seen and examined. Chart reviewed. No acute overnight events reported. Pt remains medication adherent, tolerating it well without reported side effects. Denies AVH/SI/HI. Sleeping better with increased dose of trazodone with good appetite. In good behavioral control. Pt is future oriented, more motivated for exploring inpatient rehab options for etoh use. Tolerating initiation of Wellbutrin well thus far with concerns for potential side effects explored and discussed.  No somatic complaints.  In good behavioral control.  
Pt. discussed with nursing team. Pt. seen in his room resting. He reports "I feel alright" and is feeling better since admission. He states he couldn't sleep last night, denies this is recurrent. He reports normal appetite, denies AVH, delusions, SI/HI. He reports constipation and received Miralax. Pt. advised to also increase water intake. He denies additional concerns. 
He is by and large in bed, depressed appearing, described as guarded.  COntineus to endorse depressed symptoms and this is compatible with recent substance use (etoh, utox +pcp) .  He contineus to decline ssri, remeron.  Did accept a PPD today.

## 2024-09-10 NOTE — BH INPATIENT PSYCHIATRY DISCHARGE NOTE - NSDCCPCAREPLAN_GEN_ALL_CORE_FT
PRINCIPAL DISCHARGE DIAGNOSIS  Diagnosis: Major depressive disorder  Assessment and Plan of Treatment:       SECONDARY DISCHARGE DIAGNOSES  Diagnosis: Alcohol dependence  Assessment and Plan of Treatment:

## 2024-09-10 NOTE — BH INPATIENT PSYCHIATRY DISCHARGE NOTE - HPI (INCLUDE ILLNESS QUALITY, SEVERITY, DURATION, TIMING, CONTEXT, MODIFYING FACTORS, ASSOCIATED SIGNS AND SYMPTOMS)
Patient seen, chart reviewed, case discussed with team.  I reviewed the ED chart including ED BHA, ED BH Notes, ED Provider note, ROS and labs.    He arrived on the unit uneventfully on a 913 legal status.    He reports he relapsed approximately eleven months ago when he lost his job at [employer].  He reports he had been drinking 4 pints of vodka daily and using pcp intermittently.  He reports he was looking for work but has been unable to find work.  He reports he has exhausted his unemployment and has been unable to pay rent and is in danger of being imminently evicted. He reports he has no electricity in his apartment.  He reports he has a couple of aunts and uncle with whom he has little communication.      He reports the most recent hospitalization was under the same circumstances.    He discussses some ambivalence about quitting drinking.    he reports suicidal ideation, helplessness, hopelessness and sleep and appetite changes.  He deneis any current avt hallucinations or paranoid or grandiose delusions.      In addition to suicide attempt of over ten years ago ([facility] 2012) he reports another suicide attempt (unconfirmed) three years ago when he lost his job at [employer] and before he got his [employer] job in which he reports he took multiple pills went to sleep but woke up in the morning and went about his business.     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  as of now does not desire any here    deneis access to fire arms  ============================  AS PER THE [facility] ED BHA DATED 8/13/2024  Patient is a 49 year old, male; domicile alone; single; noncaregiver; unemployed; PPH of depression; two previous hospitalizations (discharged today from [facility]) ; one past suicide attempt by overdose over 10 years ago; remote history of violence or arrests; h/o ETOH and PCP abuse ( reports having one beer today); no known h/o complicated withdrawal; PMH of arthritis to right knee; brought in by EMS; called by aunt for suicidal ideation.     Patient reports h/o depression with worsening symptoms the past month in the context of psychosocial stressors. Patient reports he lost his job approximately 10 months ago and is now facing eviction from his home. Patient was admitted [facility] for suicidal ideation on 8/6 and discharged today. He reports he was not suicidal at time of discharge but when he arrived home and was sitting is his apartment these thoughts returned. Patient then went to his aunts home and asked her to call 911. Patient reports one prior suicide attempt but overdose over 10 years ago by overdose. At that time he required intubation and was admitted medically. He reports he stopped treatment because of the sexual side effects refused medication during this past admission at [facility].  Patient has a h/o ETOH and PCP abuse. He reports he was sober for over 10 years relapsed 1.5 years ago. Patient admits to drinking a beer prior to coming to the ED today.   As far as other psychiatric symptoms, patient denies past or recent symptoms of psychosis or yun.     See  note for collateral

## 2024-09-10 NOTE — BH DISCHARGE NOTE NURSING/SOCIAL WORK/PSYCH REHAB - DISCHARGE INSTRUCTIONS AFTERCARE APPOINTMENTS
In order to check the location, date, or time of your aftercare appointment, please refer to your Discharge Instructions Document given to you upon leaving the hospital.  If you have lost the instructions please call [phone]

## 2024-09-10 NOTE — BH INPATIENT PSYCHIATRY DISCHARGE NOTE - NSBHASSESSSUMMFT_PSY_ALL_CORE
Patient is a 49 year old, male; domicile alone; single; noncaregiver; unemployed; PPH of depression; two previous hospitalizations (discharged today from [facility]) ; one past suicide attempt by overdose over 10 years ago; remote history of violence or arrests; h/o ETOH and PCP abuse ( reports having one beer today); no known h/o complicated withdrawal; PMH of arthritis to right knee; brought in by EMS; called by aunt for suicidal ideation.   He was discharged from another inpatient service on the day of admission.  Utox +etoh (98) and PCP.  Differential includes mdd, severe and substance induced mood disorder.      Patient reports mood improved, denies AVH, delusions, SI/HI. Sleep improved and denies depressed mood.  Remains focused on treatment with rehab bed in place, plan for discharge today with e-scripts submitted to designated pharmacy.  Pt is psychiatrically and medically stable for discharge.    1. Continue 913 hospitalization for stabilization and safety  2. haldol, ativan and benadryl prns away from alcohol while on other inpatient services, one day of drinking, withdrawal seizures unlikely  3. routine checks--reports feeling safe on unit, will notify staff if suicidality returns, no CO indicated.  4. d/c wellbutrin  mg d/t reports increased irritability, continue with Trazodone 100mg QHS for insomnia  5.  arthritic knee pain-- ordered ibuprofen prn, miralax prn constipation, maalox prn  6. smoking cessation--declines patch will write for gum  7. group/ milieu therapy  8. Dispo: has a [facility] bed with dc plan for today, 9/11.

## 2024-09-10 NOTE — BH INPATIENT PSYCHIATRY PROGRESS NOTE - NSBHCHARTREVIEWVS_PSY_A_CORE FT
Vital Signs Last 24 Hrs  T(C): 36.3 (08-21-24 @ 08:08), Max: 36.3 (08-21-24 @ 08:08)  T(F): 97.4 (08-21-24 @ 08:08), Max: 97.4 (08-21-24 @ 08:08)  HR: --  BP: --  BP(mean): --  RR: 18 (08-21-24 @ 08:08) (18 - 18)  SpO2: --    Orthostatic VS  08-21-24 @ 08:08  Lying BP: --/-- HR: --  Sitting BP: 104/72 HR: 64  Standing BP: 100/68 HR: 71  Site: --  Mode: --  Orthostatic VS  08-20-24 @ 08:34  Lying BP: --/-- HR: --  Sitting BP: 110/66 HR: 73  Standing BP: 106/75 HR: 76  Site: --  Mode: --  
Vital Signs Last 24 Hrs  T(C): 36.7 (08-30-24 @ 08:12), Max: 36.7 (08-30-24 @ 08:12)  T(F): 98.1 (08-30-24 @ 08:12), Max: 98.1 (08-30-24 @ 08:12)  HR: --  BP: --  BP(mean): --  RR: --  SpO2: --    Orthostatic VS  08-30-24 @ 08:12  Lying BP: --/-- HR: --  Sitting BP: 105/74 HR: 85  Standing BP: 101/65 HR: 111  Site: --  Mode: --  Orthostatic VS  08-29-24 @ 08:02  Lying BP: --/-- HR: --  Sitting BP: 110/76 HR: 87  Standing BP: 122/70 HR: 107  Site: --  Mode: --  
Vital Signs Last 24 Hrs  T(C): 36.4 (09-09-24 @ 08:09), Max: 36.4 (09-09-24 @ 08:09)  T(F): 97.6 (09-09-24 @ 08:09), Max: 97.6 (09-09-24 @ 08:09)  HR: --  BP: --  BP(mean): --  RR: --  SpO2: --    Orthostatic VS  09-09-24 @ 08:09  Lying BP: --/-- HR: --  Sitting BP: 132/82 HR: 65  Standing BP: 137/91 HR: 70  Site: --  Mode: --  Orthostatic VS  09-08-24 @ 07:59  Lying BP: --/-- HR: --  Sitting BP: 116/83 HR: 71  Standing BP: 121/85 HR: 78  Site: --  Mode: --  
Vital Signs Last 24 Hrs  T(C): 36.7 (09-06-24 @ 08:25), Max: 36.7 (09-06-24 @ 08:25)  T(F): 98 (09-06-24 @ 08:25), Max: 98 (09-06-24 @ 08:25)  HR: --  BP: --  BP(mean): --  RR: --  SpO2: --    Orthostatic VS  09-06-24 @ 08:25  Lying BP: --/-- HR: --  Sitting BP: 111/68 HR: 94  Standing BP: 129/80 HR: 94  Site: --  Mode: --  Orthostatic VS  09-05-24 @ 08:02  Lying BP: --/-- HR: --  Sitting BP: 111/80 HR: 72  Standing BP: 109/80 HR: 85  Site: --  Mode: --  
Vital Signs Last 24 Hrs  T(C): 36.5 (08-23-24 @ 08:14), Max: 36.5 (08-23-24 @ 08:14)  T(F): 97.7 (08-23-24 @ 08:14), Max: 97.7 (08-23-24 @ 08:14)  HR: --  BP: --  BP(mean): --  RR: --  SpO2: --    Orthostatic VS  08-23-24 @ 08:14  Lying BP: --/-- HR: --  Sitting BP: 103/66 HR: 82  Standing BP: 101/54 HR: 91  Site: --  Mode: --  Orthostatic VS  08-22-24 @ 08:27  Lying BP: --/-- HR: --  Sitting BP: 106/73 HR: 68  Standing BP: 115/86 HR: 80  Site: --  Mode: --  
Vital Signs Last 24 Hrs  T(C): 36.5 (09-10-24 @ 08:09), Max: 36.5 (09-10-24 @ 08:09)  T(F): 97.7 (09-10-24 @ 08:09), Max: 97.7 (09-10-24 @ 08:09)  HR: --  BP: --  BP(mean): --  RR: --  SpO2: --    Orthostatic VS  09-10-24 @ 08:09  Lying BP: --/-- HR: --  Sitting BP: 115/73 HR: 79  Standing BP: 108/85 HR: 110  Site: upper left arm  Mode: electronic  Orthostatic VS  09-09-24 @ 08:09  Lying BP: --/-- HR: --  Sitting BP: 132/82 HR: 65  Standing BP: 137/91 HR: 70  Site: --  Mode: --  
Vital Signs Last 24 Hrs  T(C): 36.8 (08-16-24 @ 08:12), Max: 36.9 (08-15-24 @ 18:40)  T(F): 98.3 (08-16-24 @ 08:12), Max: 98.4 (08-15-24 @ 18:40)  HR: --  BP: --  BP(mean): --  RR: --  SpO2: --    Orthostatic VS  08-16-24 @ 08:12  Lying BP: 115/83 HR: 67  Sitting BP: 124/89 HR: 69  Standing BP: --/-- HR: --  Site: --  Mode: --  Orthostatic VS  08-15-24 @ 18:40  Lying BP: --/-- HR: --  Sitting BP: 112/69 HR: 777  Standing BP: 117/84 HR: --  Site: --  Mode: --  Orthostatic VS  08-15-24 @ 07:53  Lying BP: --/-- HR: --  Sitting BP: 132/88 HR: 69  Standing BP: 125/83 HR: 81  Site: --  Mode: --  Orthostatic VS  08-14-24 @ 19:27  Lying BP: --/-- HR: --  Sitting BP: 122/71 HR: 72  Standing BP: 115/70 HR: 87  Site: --  Mode: --  Orthostatic VS  08-14-24 @ 13:01  Lying BP: --/-- HR: --  Sitting BP: 113/79 HR: 76  Standing BP: 121/83 HR: 80  Site: --  Mode: --  
Vital Signs Last 24 Hrs  T(C): 36.8 (08-22-24 @ 08:27), Max: 36.8 (08-22-24 @ 08:27)  T(F): 98.2 (08-22-24 @ 08:27), Max: 98.2 (08-22-24 @ 08:27)  HR: --  BP: --  BP(mean): --  RR: --  SpO2: --    Orthostatic VS  08-22-24 @ 08:27  Lying BP: --/-- HR: --  Sitting BP: 106/73 HR: 68  Standing BP: 115/86 HR: 80  Site: --  Mode: --  Orthostatic VS  08-21-24 @ 08:08  Lying BP: --/-- HR: --  Sitting BP: 104/72 HR: 64  Standing BP: 100/68 HR: 71  Site: --  Mode: --  
Vital Signs Last 24 Hrs  T(C): 36.6 (09-05-24 @ 08:02), Max: 36.6 (09-05-24 @ 08:02)  T(F): 97.8 (09-05-24 @ 08:02), Max: 97.8 (09-05-24 @ 08:02)  HR: --  BP: --  BP(mean): --  RR: --  SpO2: --    Orthostatic VS  09-05-24 @ 08:02  Lying BP: --/-- HR: --  Sitting BP: 111/80 HR: 72  Standing BP: 109/80 HR: 85  Site: --  Mode: --  Orthostatic VS  09-04-24 @ 08:17  Lying BP: --/-- HR: --  Sitting BP: 101/70 HR: 60  Standing BP: 98/72 HR: 62  Site: --  Mode: --  
Vital Signs Last 24 Hrs  T(C): 36.4 (08-28-24 @ 07:48), Max: 36.4 (08-28-24 @ 07:48)  T(F): 97.6 (08-28-24 @ 07:48), Max: 97.6 (08-28-24 @ 07:48)  HR: --  BP: --  BP(mean): --  RR: --  SpO2: --    Orthostatic VS  08-28-24 @ 07:48  Lying BP: --/-- HR: --  Sitting BP: 120/72 HR: 69  Standing BP: 122/72 HR: 78  Site: --  Mode: --  Orthostatic VS  08-27-24 @ 08:24  Lying BP: --/-- HR: --  Sitting BP: 109/68 HR: 72  Standing BP: 98/62 HR: 97  Site: upper left arm  Mode: electronic  
Vital Signs Last 24 Hrs  T(C): 36.5 (09-04-24 @ 08:17), Max: 36.5 (09-04-24 @ 08:17)  T(F): 97.7 (09-04-24 @ 08:17), Max: 97.7 (09-04-24 @ 08:17)  HR: --  BP: --  BP(mean): --  RR: 18 (09-04-24 @ 08:17) (18 - 18)  SpO2: --    Orthostatic VS  09-04-24 @ 08:17  Lying BP: --/-- HR: --  Sitting BP: 101/70 HR: 60  Standing BP: 98/72 HR: 62  Site: --  Mode: --  Orthostatic VS  09-03-24 @ 08:17  Lying BP: --/-- HR: --  Sitting BP: 126/99 HR: 100  Standing BP: 114/83 HR: 97  Site: --  Mode: --  
Vital Signs Last 24 Hrs  T(C): 36.4 (08-19-24 @ 08:21), Max: 36.7 (08-18-24 @ 19:12)  T(F): 97.5 (08-19-24 @ 08:21), Max: 98 (08-18-24 @ 19:12)  HR: --  BP: --  BP(mean): --  RR: --  SpO2: --    Orthostatic VS  08-19-24 @ 08:21  Lying BP: --/-- HR: --  Sitting BP: 115/90 HR: 68  Standing BP: 118/80 HR: 79  Site: --  Mode: --  Orthostatic VS  08-18-24 @ 19:12  Lying BP: --/-- HR: --  Sitting BP: 114/76 HR: 65  Standing BP: --/-- HR: --  Site: --  Mode: --  Orthostatic VS  08-18-24 @ 08:25  Lying BP: --/-- HR: --  Sitting BP: 119/70 HR: 64  Standing BP: 123/90 HR: 75  Site: --  Mode: --  Orthostatic VS  08-17-24 @ 19:01  Lying BP: --/-- HR: --  Sitting BP: 133/64 HR: 65  Standing BP: 114/71 HR: 70  Site: --  Mode: --  
Vital Signs Last 24 Hrs  T(C): 36.7 (08-20-24 @ 08:34), Max: 36.7 (08-20-24 @ 08:34)  T(F): 98 (08-20-24 @ 08:34), Max: 98 (08-20-24 @ 08:34)  HR: --  BP: --  BP(mean): --  RR: --  SpO2: --    Orthostatic VS  08-20-24 @ 08:34  Lying BP: --/-- HR: --  Sitting BP: 110/66 HR: 73  Standing BP: 106/75 HR: 76  Site: --  Mode: --  Orthostatic VS  08-19-24 @ 08:21  Lying BP: --/-- HR: --  Sitting BP: 115/90 HR: 68  Standing BP: 118/80 HR: 79  Site: --  Mode: --  Orthostatic VS  08-18-24 @ 19:12  Lying BP: --/-- HR: --  Sitting BP: 114/76 HR: 65  Standing BP: --/-- HR: --  Site: --  Mode: --  
Vital Signs Last 24 Hrs  T(C): 36.8 (08-18-24 @ 08:25), Max: 36.8 (08-17-24 @ 19:01)  T(F): 98.2 (08-18-24 @ 08:25), Max: 98.2 (08-17-24 @ 19:01)  HR: --  BP: --  BP(mean): --  RR: --  SpO2: --    Orthostatic VS  08-18-24 @ 08:25  Lying BP: --/-- HR: --  Sitting BP: 119/70 HR: 64  Standing BP: 123/90 HR: 75  Site: --  Mode: --  Orthostatic VS  08-17-24 @ 19:01  Lying BP: --/-- HR: --  Sitting BP: 133/64 HR: 65  Standing BP: 114/71 HR: 70  Site: --  Mode: --  Orthostatic VS  08-17-24 @ 08:18  Lying BP: --/-- HR: --  Sitting BP: 121/86 HR: 69  Standing BP: 121/89 HR: 75  Site: --  Mode: --  Orthostatic VS  08-16-24 @ 20:09  Lying BP: --/-- HR: --  Sitting BP: 111/74 HR: 69  Standing BP: 108/76 HR: 79  Site: --  Mode: --  
Vital Signs Last 24 Hrs  T(C): 36.6 (08-15-24 @ 07:53), Max: 37.2 (08-14-24 @ 19:27)  T(F): 97.8 (08-15-24 @ 07:53), Max: 99 (08-14-24 @ 19:27)  HR: --  BP: --  BP(mean): --  RR: --  SpO2: --    Orthostatic VS  08-15-24 @ 07:53  Lying BP: --/-- HR: --  Sitting BP: 132/88 HR: 69  Standing BP: 125/83 HR: 81  Site: --  Mode: --  Orthostatic VS  08-14-24 @ 19:27  Lying BP: --/-- HR: --  Sitting BP: 122/71 HR: 72  Standing BP: 115/70 HR: 87  Site: --  Mode: --  Orthostatic VS  08-14-24 @ 13:01  Lying BP: --/-- HR: --  Sitting BP: 113/79 HR: 76  Standing BP: 121/83 HR: 80  Site: --  Mode: --  
Vital Signs Last 24 Hrs  T(C): 36.5 (08-26-24 @ 08:15), Max: 36.5 (08-26-24 @ 08:15)  T(F): 97.7 (08-26-24 @ 08:15), Max: 97.7 (08-26-24 @ 08:15)  HR: --  BP: --  BP(mean): --  RR: 18 (08-26-24 @ 08:15) (18 - 18)  SpO2: --    Orthostatic VS  08-26-24 @ 08:15  Lying BP: --/-- HR: --  Sitting BP: 108/78 HR: 70  Standing BP: 110/74 HR: 77  Site: --  Mode: --  Orthostatic VS  08-25-24 @ 08:29  Lying BP: --/-- HR: --  Sitting BP: 102/75 HR: 64  Standing BP: 114/80 HR: 55  Site: --  Mode: --  
Vital Signs Last 24 Hrs  T(C): 36.6 (09-03-24 @ 08:17), Max: 36.6 (09-03-24 @ 08:17)  T(F): 97.8 (09-03-24 @ 08:17), Max: 97.8 (09-03-24 @ 08:17)  HR: --  BP: --  BP(mean): --  RR: --  SpO2: --    Orthostatic VS  09-03-24 @ 08:17  Lying BP: --/-- HR: --  Sitting BP: 126/99 HR: 100  Standing BP: 114/83 HR: 97  Site: --  Mode: --  Orthostatic VS  09-02-24 @ 07:57  Lying BP: --/-- HR: --  Sitting BP: 120/88 HR: 81  Standing BP: 109/84 HR: 104  Site: --  Mode: --  
Vital Signs Last 24 Hrs  T(C): 36.7 (08-17-24 @ 08:18), Max: 36.7 (08-16-24 @ 20:09)  T(F): 98 (08-17-24 @ 08:18), Max: 98.1 (08-16-24 @ 20:09)  HR: --  BP: --  BP(mean): --  RR: --  SpO2: --    Orthostatic VS  08-17-24 @ 08:18  Lying BP: --/-- HR: --  Sitting BP: 121/86 HR: 69  Standing BP: 121/89 HR: 75  Site: --  Mode: --  Orthostatic VS  08-16-24 @ 20:09  Lying BP: --/-- HR: --  Sitting BP: 111/74 HR: 69  Standing BP: 108/76 HR: 79  Site: --  Mode: --  Orthostatic VS  08-16-24 @ 08:12  Lying BP: 115/83 HR: 67  Sitting BP: 124/89 HR: 69  Standing BP: --/-- HR: --  Site: --  Mode: --  Orthostatic VS  08-15-24 @ 18:40  Lying BP: --/-- HR: --  Sitting BP: 112/69 HR: 777  Standing BP: 117/84 HR: --  Site: --  Mode: --  
Vital Signs Last 24 Hrs  T(C): 36.4 (08-27-24 @ 08:24), Max: 36.4 (08-27-24 @ 08:24)  T(F): 97.6 (08-27-24 @ 08:24), Max: 97.6 (08-27-24 @ 08:24)  HR: --  BP: --  BP(mean): --  RR: --  SpO2: --    Orthostatic VS  08-27-24 @ 08:24  Lying BP: --/-- HR: --  Sitting BP: 109/68 HR: 72  Standing BP: 98/62 HR: 97  Site: upper left arm  Mode: electronic  Orthostatic VS  08-26-24 @ 08:15  Lying BP: --/-- HR: --  Sitting BP: 108/78 HR: 70  Standing BP: 110/74 HR: 77  Site: --  Mode: --  
Vital Signs Last 24 Hrs  T(C): 36.5 (08-29-24 @ 08:02), Max: 36.5 (08-29-24 @ 08:02)  T(F): 97.7 (08-29-24 @ 08:02), Max: 97.7 (08-29-24 @ 08:02)  HR: --  BP: --  BP(mean): --  RR: --  SpO2: --    Orthostatic VS  08-29-24 @ 08:02  Lying BP: --/-- HR: --  Sitting BP: 110/76 HR: 87  Standing BP: 122/70 HR: 107  Site: --  Mode: --  Orthostatic VS  08-28-24 @ 07:48  Lying BP: --/-- HR: --  Sitting BP: 120/72 HR: 69  Standing BP: 122/72 HR: 78  Site: --  Mode: --

## 2024-09-10 NOTE — BH INPATIENT PSYCHIATRY DISCHARGE NOTE - NSBHMETABOLIC_PSY_ALL_CORE_FT
BMI: BMI (kg/m2): 27 (08-14-24 @ 13:01)  HbA1c:   Glucose:   BP: --Vital Signs Last 24 Hrs  T(C): 36.5 (09-10-24 @ 08:09), Max: 36.5 (09-10-24 @ 08:09)  T(F): 97.7 (09-10-24 @ 08:09), Max: 97.7 (09-10-24 @ 08:09)  HR: --  BP: --  BP(mean): --  RR: --  SpO2: --    Orthostatic VS  09-10-24 @ 08:09  Lying BP: --/-- HR: --  Sitting BP: 115/73 HR: 79  Standing BP: 108/85 HR: 110  Site: upper left arm  Mode: electronic  Orthostatic VS  09-09-24 @ 08:09  Lying BP: --/-- HR: --  Sitting BP: 132/82 HR: 65  Standing BP: 137/91 HR: 70  Site: --  Mode: --    Lipid Panel:

## 2024-09-10 NOTE — BH INPATIENT PSYCHIATRY PROGRESS NOTE - NSICDXBHPRIMARYDX_PSY_ALL_CORE
Major depressive disorder   F32.9  

## 2024-09-10 NOTE — BH DISCHARGE NOTE NURSING/SOCIAL WORK/PSYCH REHAB - PATIENT PORTAL LINK FT
You can access the FollowMyHealth Patient Portal offered by [facility] by registering at the following website: [email or web address]. By joining [facility]’s FollowMyHealth portal, you will also be able to view your health information using other applications (apps) compatible with our system.

## 2024-09-11 VITALS — TEMPERATURE: 98 F
